# Patient Record
Sex: FEMALE | Race: WHITE | Employment: OTHER | URBAN - METROPOLITAN AREA
[De-identification: names, ages, dates, MRNs, and addresses within clinical notes are randomized per-mention and may not be internally consistent; named-entity substitution may affect disease eponyms.]

---

## 2017-01-17 PROBLEM — I42.9 IDIOPATHIC CARDIOMYOPATHY (HCC): Status: ACTIVE | Noted: 2017-01-17

## 2017-01-17 PROBLEM — I50.20 UNSPECIFIED SYSTOLIC (CONGESTIVE) HEART FAILURE (HCC): Status: ACTIVE | Noted: 2017-01-17

## 2017-02-01 PROBLEM — E78.5 DYSLIPIDEMIA: Chronic | Status: ACTIVE | Noted: 2017-02-01

## 2017-02-01 PROBLEM — I25.119 CORONARY ARTERY DISEASE INVOLVING NATIVE CORONARY ARTERY OF NATIVE HEART WITH ANGINA PECTORIS (HCC): Chronic | Status: ACTIVE | Noted: 2017-02-01

## 2017-02-01 PROBLEM — I34.1 MITRAL VALVE PROLAPSE: Status: ACTIVE | Noted: 2017-02-01

## 2017-02-01 PROBLEM — I51.81 TAKOTSUBO CARDIOMYOPATHY: Chronic | Status: ACTIVE | Noted: 2017-02-01

## 2017-02-01 PROBLEM — I42.9 IDIOPATHIC CARDIOMYOPATHY (HCC): Status: RESOLVED | Noted: 2017-01-17 | Resolved: 2017-02-01

## 2019-04-24 ENCOUNTER — HOSPITAL ENCOUNTER (OUTPATIENT)
Dept: LAB | Age: 84
Discharge: HOME | End: 2019-04-24
Attending: INTERNAL MEDICINE
Payer: MEDICARE

## 2019-04-24 DIAGNOSIS — Z01.810 PREOP CARDIOVASCULAR EXAM: ICD-10-CM

## 2019-04-24 DIAGNOSIS — I49.3 PVC (PREMATURE VENTRICULAR CONTRACTION): ICD-10-CM

## 2019-04-24 DIAGNOSIS — I51.81 TAKOTSUBO CARDIOMYOPATHY: Chronic | ICD-10-CM

## 2019-04-24 DIAGNOSIS — I35.1 NONRHEUMATIC AORTIC VALVE INSUFFICIENCY: ICD-10-CM

## 2019-04-24 DIAGNOSIS — I34.0 NON-RHEUMATIC MITRAL REGURGITATION: ICD-10-CM

## 2019-04-24 LAB
ALBUMIN SERPL-MCNC: 3.7 G/DL (ref 3.2–4.6)
ALBUMIN/GLOB SERPL: 1 {RATIO}
ALP SERPL-CCNC: 78 U/L (ref 50–136)
ALT SERPL-CCNC: 29 U/L (ref 12–65)
ANION GAP SERPL CALC-SCNC: 6 MMOL/L
AST SERPL-CCNC: 28 U/L (ref 15–37)
BASOPHILS # BLD: 0 K/UL (ref 0–0.2)
BASOPHILS NFR BLD: 0 % (ref 0–2)
BILIRUB SERPL-MCNC: 1.5 MG/DL (ref 0.2–1.1)
BUN SERPL-MCNC: 15 MG/DL (ref 8–23)
CALCIUM SERPL-MCNC: 9.8 MG/DL (ref 8.3–10.4)
CHLORIDE SERPL-SCNC: 106 MMOL/L (ref 98–107)
CO2 SERPL-SCNC: 28 MMOL/L (ref 21–32)
CREAT SERPL-MCNC: 0.8 MG/DL (ref 0.6–1)
DIFFERENTIAL METHOD BLD: ABNORMAL
EOSINOPHIL # BLD: 0.1 K/UL (ref 0–0.8)
EOSINOPHIL NFR BLD: 2 % (ref 0.5–7.8)
ERYTHROCYTE [DISTWIDTH] IN BLOOD BY AUTOMATED COUNT: 11.5 % (ref 11.9–14.6)
GLOBULIN SER CALC-MCNC: 3.7 G/DL
GLUCOSE SERPL-MCNC: 93 MG/DL (ref 65–100)
HCT VFR BLD AUTO: 43.3 % (ref 35.8–46.3)
HGB BLD-MCNC: 14.7 G/DL (ref 11.7–15.4)
IMM GRANULOCYTES # BLD AUTO: 0 K/UL (ref 0–0.5)
IMM GRANULOCYTES NFR BLD AUTO: 1 % (ref 0–5)
LYMPHOCYTES # BLD: 1.7 K/UL (ref 0.5–4.6)
LYMPHOCYTES NFR BLD: 28 % (ref 13–44)
MAGNESIUM SERPL-MCNC: 2.1 MG/DL (ref 1.8–2.4)
MCH RBC QN AUTO: 32.9 PG (ref 26.1–32.9)
MCHC RBC AUTO-ENTMCNC: 33.9 G/DL (ref 31.4–35)
MCV RBC AUTO: 96.9 FL (ref 79.6–97.8)
MONOCYTES # BLD: 0.7 K/UL (ref 0.1–1.3)
MONOCYTES NFR BLD: 11 % (ref 4–12)
NEUTS SEG # BLD: 3.5 K/UL (ref 1.7–8.2)
NEUTS SEG NFR BLD: 58 % (ref 43–78)
NRBC # BLD: 0 K/UL (ref 0–0.2)
PLATELET # BLD AUTO: 120 K/UL (ref 150–450)
PMV BLD AUTO: 8.7 FL (ref 9.4–12.3)
POTASSIUM SERPL-SCNC: 4.3 MMOL/L (ref 3.5–5.1)
PROT SERPL-MCNC: 7.4 G/DL (ref 6.3–8.2)
RBC # BLD AUTO: 4.47 M/UL (ref 4.05–5.2)
SODIUM SERPL-SCNC: 140 MMOL/L (ref 136–145)
TSH SERPL DL<=0.005 MIU/L-ACNC: 2.3 UIU/ML (ref 0.36–3.74)
WBC # BLD AUTO: 6 K/UL (ref 4.3–11.1)

## 2019-04-24 PROCEDURE — 36415 COLL VENOUS BLD VENIPUNCTURE: CPT

## 2019-04-24 PROCEDURE — 84443 ASSAY THYROID STIM HORMONE: CPT

## 2019-04-24 PROCEDURE — 83735 ASSAY OF MAGNESIUM: CPT

## 2019-04-24 PROCEDURE — 80053 COMPREHEN METABOLIC PANEL: CPT

## 2019-04-24 PROCEDURE — 85025 COMPLETE CBC W/AUTO DIFF WBC: CPT

## 2019-09-20 PROBLEM — Z01.810 PREOP CARDIOVASCULAR EXAM: Status: RESOLVED | Noted: 2019-04-24 | Resolved: 2019-09-20

## 2019-09-24 ENCOUNTER — HOSPITAL ENCOUNTER (OUTPATIENT)
Dept: LAB | Age: 84
Discharge: HOME | End: 2019-09-24
Attending: INTERNAL MEDICINE
Payer: MEDICARE

## 2019-09-24 ENCOUNTER — HOSPITAL ENCOUNTER (OUTPATIENT)
Dept: GENERAL RADIOLOGY | Age: 84
Discharge: HOME | End: 2019-09-24
Attending: INTERNAL MEDICINE

## 2019-09-24 DIAGNOSIS — I49.3 PVC (PREMATURE VENTRICULAR CONTRACTION): ICD-10-CM

## 2019-09-24 LAB
ALBUMIN SERPL-MCNC: 3.4 G/DL (ref 3.2–4.6)
ALBUMIN/GLOB SERPL: 0.9 {RATIO} (ref 1.2–3.5)
ALP SERPL-CCNC: 69 U/L (ref 50–136)
ALT SERPL-CCNC: 23 U/L (ref 12–65)
AST SERPL-CCNC: 21 U/L (ref 15–37)
BILIRUB DIRECT SERPL-MCNC: 0.2 MG/DL
BILIRUB SERPL-MCNC: 1 MG/DL (ref 0.2–1.1)
GLOBULIN SER CALC-MCNC: 3.9 G/DL (ref 2.3–3.5)
PROT SERPL-MCNC: 7.3 G/DL (ref 6.3–8.2)
TSH SERPL DL<=0.005 MIU/L-ACNC: 2.93 UIU/ML (ref 0.36–3.74)

## 2019-09-24 PROCEDURE — 80076 HEPATIC FUNCTION PANEL: CPT

## 2019-09-24 PROCEDURE — 84443 ASSAY THYROID STIM HORMONE: CPT

## 2019-09-24 PROCEDURE — 36415 COLL VENOUS BLD VENIPUNCTURE: CPT

## 2020-08-11 ENCOUNTER — HOSPITAL ENCOUNTER (OUTPATIENT)
Dept: LAB | Age: 85
Discharge: HOME | End: 2020-08-11
Payer: MEDICARE

## 2020-08-11 DIAGNOSIS — E78.5 DYSLIPIDEMIA: Chronic | ICD-10-CM

## 2020-08-11 LAB
ALBUMIN SERPL-MCNC: 3.5 G/DL (ref 3.2–4.6)
ALBUMIN/GLOB SERPL: 1 {RATIO} (ref 1.2–3.5)
ALP SERPL-CCNC: 69 U/L (ref 50–136)
ALT SERPL-CCNC: 20 U/L (ref 12–65)
AST SERPL-CCNC: 19 U/L (ref 15–37)
BILIRUB DIRECT SERPL-MCNC: 0.2 MG/DL
BILIRUB SERPL-MCNC: 0.8 MG/DL (ref 0.2–1.1)
CHOLEST SERPL-MCNC: 159 MG/DL
GLOBULIN SER CALC-MCNC: 3.5 G/DL (ref 2.3–3.5)
HDLC SERPL-MCNC: 71 MG/DL (ref 40–60)
HDLC SERPL: 2.2 {RATIO}
LDLC SERPL CALC-MCNC: 67.6 MG/DL
LIPID PROFILE,FLP: ABNORMAL
PROT SERPL-MCNC: 7 G/DL (ref 6.3–8.2)
TRIGL SERPL-MCNC: 102 MG/DL (ref 35–150)
VLDLC SERPL CALC-MCNC: 20.4 MG/DL (ref 6–23)

## 2020-08-11 PROCEDURE — 80061 LIPID PANEL: CPT

## 2020-08-11 PROCEDURE — 80076 HEPATIC FUNCTION PANEL: CPT

## 2020-08-11 PROCEDURE — 36415 COLL VENOUS BLD VENIPUNCTURE: CPT

## 2021-04-21 PROBLEM — R07.89 ATYPICAL CHEST PAIN: Status: ACTIVE | Noted: 2021-04-21

## 2021-04-21 PROBLEM — G45.3 AMAUROSIS FUGAX OF LEFT EYE: Status: ACTIVE | Noted: 2021-04-21

## 2021-07-12 ENCOUNTER — HOSPITAL ENCOUNTER (OUTPATIENT)
Dept: LAB | Age: 86
Discharge: HOME | End: 2021-07-12
Payer: MEDICARE

## 2021-07-12 DIAGNOSIS — I48.92 ATRIAL FLUTTER, UNSPECIFIED TYPE (HCC): ICD-10-CM

## 2021-07-12 DIAGNOSIS — E78.5 DYSLIPIDEMIA: ICD-10-CM

## 2021-07-12 LAB
ALBUMIN SERPL-MCNC: 3.7 G/DL (ref 3.2–4.6)
ALBUMIN/GLOB SERPL: 1.2 {RATIO} (ref 1.2–3.5)
ALP SERPL-CCNC: 76 U/L (ref 50–136)
ALT SERPL-CCNC: 26 U/L (ref 12–65)
ANION GAP SERPL CALC-SCNC: 4 MMOL/L (ref 7–16)
AST SERPL-CCNC: 22 U/L (ref 15–37)
BILIRUB SERPL-MCNC: 0.6 MG/DL (ref 0.2–1.1)
BUN SERPL-MCNC: 20 MG/DL (ref 8–23)
CALCIUM SERPL-MCNC: 9.1 MG/DL (ref 8.3–10.4)
CHLORIDE SERPL-SCNC: 107 MMOL/L (ref 98–107)
CHOLEST SERPL-MCNC: 136 MG/DL
CO2 SERPL-SCNC: 27 MMOL/L (ref 21–32)
CREAT SERPL-MCNC: 0.75 MG/DL (ref 0.6–1)
GLOBULIN SER CALC-MCNC: 3.2 G/DL (ref 2.3–3.5)
GLUCOSE SERPL-MCNC: 129 MG/DL (ref 65–100)
HDLC SERPL-MCNC: 60 MG/DL (ref 40–60)
HDLC SERPL: 2.3 {RATIO}
LDLC SERPL CALC-MCNC: 37.2 MG/DL
POTASSIUM SERPL-SCNC: 4 MMOL/L (ref 3.5–5.1)
PROT SERPL-MCNC: 6.9 G/DL (ref 6.3–8.2)
SODIUM SERPL-SCNC: 138 MMOL/L (ref 136–145)
TRIGL SERPL-MCNC: 194 MG/DL (ref 35–150)
VLDLC SERPL CALC-MCNC: 38.8 MG/DL (ref 6–23)

## 2021-07-12 PROCEDURE — 36415 COLL VENOUS BLD VENIPUNCTURE: CPT

## 2021-07-12 PROCEDURE — 80061 LIPID PANEL: CPT

## 2021-07-12 PROCEDURE — 80053 COMPREHEN METABOLIC PANEL: CPT

## 2021-10-18 PROBLEM — R26.89 BALANCE PROBLEMS: Status: ACTIVE | Noted: 2021-10-18

## 2021-10-19 ENCOUNTER — HOSPITAL ENCOUNTER (OUTPATIENT)
Dept: PHYSICAL THERAPY | Age: 86
Discharge: HOME | End: 2021-10-19
Payer: MEDICARE

## 2021-10-19 DIAGNOSIS — R26.89 BALANCE PROBLEMS: ICD-10-CM

## 2021-10-19 PROCEDURE — 97161 PT EVAL LOW COMPLEX 20 MIN: CPT

## 2021-10-19 PROCEDURE — 97110 THERAPEUTIC EXERCISES: CPT

## 2021-10-19 NOTE — PROGRESS NOTES
Alessia Ford  : 1935  Primary: [employer]  Secondary:  [address] at [facility]  [address], [address]  [phone]   [phone]         OUTPATIENT PHYSICAL THERAPY:Daily Note 10/19/2021      TREATMENT:   PT Patient Time In/Time Out  Time In: 1430  Time Out: 1515      Total Time: 45min  Visit Count:  1     ICD-10: Treatment Diagnosis: M62.81, R26.2, R26.89  Medication Last Reviewed: 10/19/21      TREATMENT PLAN  Effective Dates: 10/19/2021 TO 2022 (90 days). Frequency/Duration: 2 times a week for 90 Day(s)         Subjective: See Evaluation Note dated 10/19/2021 for details   Pain:     Objective: See Evaluation Note dated 10/19/2021 for details      Therapeutic Exercise: (20min) Done in order to improve strength, ROM and understanding of current condition.     Date:  10/19 Date:   Date:   Date:     Activity/Exercise Parameters      Education Discussed examination findings, HEP, plan of care      Standing Balance x5min      NuStep x5min                               Manual Therapy: (0min) Done in order to improve joint and soft tissue mobility,reduce muscle guarding, and decrease muscle tone   Date:   Date:   Date:   Date:     Type Parameters      Joint Mobilization       Soft Tissue Mobilization           Modalities: (-) Done in order to reduce swelling and pain    Assessment: See Evaluation Note dated 10/19/2021 for details    Plan: See Evaluation Note dated 10/19/2021 for details    Future Appointments   Date Time Provider Riana Cabrera   2021 11:30 AM ECHO 36 [facility]   2022 11:00 AM Chandni Alvarez MD [facility]       Unbilled Time: 25min eval  Units: 1 eval low/ 1TE      [place], PT, DPT, OCS    Visit Approval Visit # Therapist initials Date A NS / Cx < 24 hr >24 hr Cx Comments    1 WJ 10/19 [x]  [] [] Initial evaluation       [] [] []        [] [] []        [] [] []        [] [] [] [] [] []        [] [] []        [] [] []        [] [] []        [] [] []        [] [] []        [] [] []        [] [] []        [] [] []        [] [] []        [] [] []        [] [] []        [] [] []

## 2021-10-19 NOTE — THERAPY EVALUATION
Alessia Ford  : 1935  Primary: [employer]  Secondary:  [address] at [facility]  [address], [address]  [phone]   [phone]          OUTPATIENT PHYSICAL THERAPY:Initial Assessment 10/19/2021   ICD-10: Treatment Diagnosis: M62.81, R26.2, R26.89  Precautions/Allergies:   Patient has no known allergies. TREATMENT PLAN:  Effective Dates: 10/19/2021 TO 2022 (30 days). Frequency/Duration: 1-4x total MEDICAL/REFERRING DIAGNOSIS:  Balance problems [R26.89]   DATE OF ONSET: 1 year  REFERRING PHYSICIAN: Mary Kay Blake MD MD Orders: Eval and treat  Return MD Appointment:      INITIAL ASSESSMENT:  Ms. Madison Ramires presents to physical therapy with MD diagnosis of a gait and balance problems. Pt demonstrated signs and symptoms consistent with this diagnosis. On objective examination, the patient demonstrated deficits in ROM, strength, joint mobility, soft tissue mobility, functional ability, functional mobility and balance. The patient also had increased pain. The patient is limited in the following activities: walking, standing, transfers, ADLs, functional tasks, bending, lifting. The patient has a good  prognosis for recovery based on the examination findings and may be limited by: financial concerns due to co-pay. Patient requires skilled physical therapy services in order to return to prior level of function. PROBLEM LIST (Impacting functional limitations):  1. Decreased Strength  2. Decreased ADL/Functional Activities  3. Decreased Balance  4. Increased Pain  5. Decreased Knowledge of Precautions  6. Decreased Mor[place] with Home Exercise Program INTERVENTIONS PLANNED: (Treatment may consist of any combination of the following)  1. Balance Exercise  2. Cold  3. Heat  4. Home Exercise Program (HEP)  5. Manual Therapy  6. Neuromuscular Re-education/Strengthening  7. Range of Motion (ROM)  8.  Therapeutic Activites  9. Therapeutic Exercise/Strengthening     GOALS: (Goals have been discussed and agreed upon with patient.)  Discharge Goals: Time Frame: today  1. Pt will be compliant with progressive HEP    OUTCOME MEASURE:   Tool Used: Sosa Balance Scale  Score:  Initial: 39/56 Most Recent: X/56 (Date: -- )   Interpretation of Score: Each section is scored on a 0-4 scale, 0 representing the patients inability to perform the task and 4 representing independence. The scores of each section are added together for a total score of 56. The higher the patients score, the more independent the patient is. Any score below 45 indicates increased risk for falls. MEDICAL NECESSITY:   · Patient is expected to demonstrate progress in strength, range of motion and symptom levels to return to full function. REASON FOR SERVICES/OTHER COMMENTS:  · Patient requires skilled physical therapy in order to return to prior level of function      Rehabilitation Potential For Stated Goals: Good  Regarding Guero Ford's therapy, I certify that the treatment plan above will be carried out by a therapist or under their direction. 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 Pt doesn't feel steady on her feet, has to use a cane when walking outside. Pt states she has a lack of confidence in her balance. Immediately following the stroke, the patient was using a walker, but that didn't last long. Past Medical History/Comorbidities:   Ms. Michael Gannon  has a past medical history of CAD (coronary artery disease), Heart failure ([address] Idiopathic cardiomyopathy ([facility] (1/17/2017), and Other ill-defined conditions(799.89). Ms. Tracey Carter  has a past surgical history that includes hx urological; hx gyn; pr cardiac surg procedure unlist; hx orthopaedic; pr abdomen surgery proc unlisted; and hx heart catheterization (2005). Social History/Living Environment:     Lives alone, stairs in house but does not have to use them  Prior Level of Function/Work/Activity:  Taking care of home, shopping  Personal Factors:          Sex:  female        Age:  82 y.o. Ambulatory/Rehab Services H2 Model Falls Risk Assessment   Risk Factors:       No Risk Factors Identified Ability to Rise from Chair:       (1)  Pushes up, successful in one attempt   Falls Prevention Plan: Mobility Assistance Device (specify):  Cane   Total: (5 or greater = High Risk): 1   ©2010 [facility] [employer] States Patent #[id number]. Federal Law prohibits the replication, distribution or use without written permission from [facility]   Current Medications:       Current Outpatient Medications:     atorvastatin (LIPITOR) 10 mg tablet, Take 1 Tablet by mouth daily. , Disp: 90 Tablet, Rfl: 3    rivaroxaban (Xarelto) 20 mg tab tablet, Take 1 Tablet by mouth daily (with dinner). , Disp: 90 Tablet, Rfl: 3    carvediloL (COREG) 3.125 mg tablet, TAKE 1 TABLET TWICE DAILY WITH MEALS, Disp: 180 Tab, Rfl: 3    ascorbic acid, vitamin C, (VITAMIN C) 500 mg tablet, 500 mg daily. , Disp: , Rfl:     cholecalciferol (VITAMIN D3) (5000 Units/125 mcg) tab tablet, Take 5,000 Units by mouth daily. , Disp: , Rfl:     CENTRUM SILVER Tab, take by mouth daily.  , Disp: , Rfl:     CALTRATE PLUS PO, take by mouth two (2) times a day. , Disp: , Rfl:    Date Last Reviewed:  10/19/21     Number of Personal Factors/Comorbidities that affect the Plan of Care: 1-2: MODERATE COMPLEXITY   EXAMINATION:   *= Painful     WNL= within normal limits     NT= not tested    *= Painful     WNL= within normal limits     NT= not tested    Observation  Gait: walks with cane, some stopping and hesitancy with turning and on initially standing, short steps      Strength (all MMT scores are graded on a scale of 0-5)   Right Left   Hip      Flexion 5 5   Abduction 3+ 3+   Extension 3+ 3+   Glute Max 3+ 3+   Knee     Extension 4 4   Flexion 4 4         Functional Mobility  TUG: 15.5sec  30sec Sit to Stand: 8x (w/ hands)      Balance  Rhomberg:   Eyes open, stable surface: > 100sec   Eyes closed, stable surface: 20sec  Sharpened Rhomberg:   R in front: 3sec   L in front: 5sec  SL balance:   R: 0sec   L: 0sec    Tool Used: TINETTI  Score:  Initial:   Gait: 9/12  Balance: 14/16  TOTAL: 23/28 Most Recent:  Gait: /12  Balance: /16  TOTAL: /28   Interpretation of Score: The maximum score for the gait component is 12 points. The maximum score for the balance component is 16 points. The maximum total score is 28 points. In general, patients who score below 19 are at a high risk for falls. Patients who score in the range of 19-24 indicate that the patient has a risk for falls. Tool Used: Dynamic Gait Index  Score:  Initial: 15/24 Most Recent: X/24 (Date: -- )   Interpretation of Score: Each section is scored on a 0-3 scale, 0 representing the patients inability to perform the task and 3 representing independence. The scores of each section are added together for a total score of 24. Any score below 19 indicates increased risk for falls. Body Structures Involved:  1. Bones  2. Joints  3. Muscles  4. Ligaments Body Functions Affected:  1. Sensory/Pain  2. Neuromusculoskeletal  3. Movement Related Activities and Participation Affected:  1. General Tasks and Demands  2. Mobility  3. Self Care  4. Domestic Life  5. Interpersonal Interactions and Relationships  6.  Community, Social and [place]   Number of elements (examined above) that affect the Plan of Care: 3: MODERATE COMPLEXITY   CLINICAL PRESENTATION:   Presentation: Stable and uncomplicated: LOW COMPLEXITY   CLINICAL DECISION MAKING: Use of outcome tool(s) and clinical judgement create a POC that gives a: Clear prediction of patient's progress: LOW COMPLEXITY     Ivy Saldana PT, DPT, OCS

## 2022-01-27 PROBLEM — R93.1 ABNORMAL NUCLEAR CARDIAC IMAGING TEST: Status: ACTIVE | Noted: 2022-01-27

## 2022-02-04 NOTE — THERAPY DISCHARGE
Alessia CARIAS Vernaconcetta  : 1935  Primary: [employer]  Secondary:  [address]  at [facility]  [address], [facility] [address]  [phone]   [phone]      Angela Valdez has been seen in physical therapy from 10/19 to 10/19 for 1 visits. Treatment has been discontinued at this time due to financial reasons due to high co-pay. The below goals were met prior to discontinuation. Some goals were not met due to unable to return.    Thank you for this referral.         Rondall Romberg PT, DPT, OCS

## 2022-02-07 ENCOUNTER — HOSPITAL ENCOUNTER (OUTPATIENT)
Dept: LAB | Age: 87
Discharge: HOME | End: 2022-02-07
Payer: MEDICARE

## 2022-02-07 DIAGNOSIS — R93.1 ABNORMAL NUCLEAR CARDIAC IMAGING TEST: ICD-10-CM

## 2022-02-07 DIAGNOSIS — I50.22 SYSTOLIC CHF, CHRONIC (HCC): ICD-10-CM

## 2022-02-07 DIAGNOSIS — I25.119 CORONARY ARTERY DISEASE INVOLVING NATIVE CORONARY ARTERY OF NATIVE HEART WITH ANGINA PECTORIS (HCC): Chronic | ICD-10-CM

## 2022-02-07 LAB
ANION GAP SERPL CALC-SCNC: 0 MMOL/L (ref 7–16)
BASOPHILS # BLD: 0 K/UL (ref 0–0.2)
BASOPHILS NFR BLD: 0 % (ref 0–2)
BUN SERPL-MCNC: 18 MG/DL (ref 8–23)
CALCIUM SERPL-MCNC: 9.7 MG/DL (ref 8.3–10.4)
CHLORIDE SERPL-SCNC: 105 MMOL/L (ref 98–107)
CO2 SERPL-SCNC: 31 MMOL/L (ref 21–32)
CREAT SERPL-MCNC: 0.66 MG/DL (ref 0.6–1)
DIFFERENTIAL METHOD BLD: ABNORMAL
EOSINOPHIL # BLD: 0.2 K/UL (ref 0–0.8)
EOSINOPHIL NFR BLD: 3 % (ref 0.5–7.8)
ERYTHROCYTE [DISTWIDTH] IN BLOOD BY AUTOMATED COUNT: 12 % (ref 11.9–14.6)
GLUCOSE SERPL-MCNC: 100 MG/DL (ref 65–100)
HCT VFR BLD AUTO: 39.1 % (ref 35.8–46.3)
HGB BLD-MCNC: 13 G/DL (ref 11.7–15.4)
IMM GRANULOCYTES # BLD AUTO: 0 K/UL (ref 0–0.5)
IMM GRANULOCYTES NFR BLD AUTO: 0 % (ref 0–5)
INR PPP: 1.1
LYMPHOCYTES # BLD: 1.4 K/UL (ref 0.5–4.6)
LYMPHOCYTES NFR BLD: 25 % (ref 13–44)
MCH RBC QN AUTO: 32.5 PG (ref 26.1–32.9)
MCHC RBC AUTO-ENTMCNC: 33.2 G/DL (ref 31.4–35)
MCV RBC AUTO: 97.8 FL (ref 79.6–97.8)
MONOCYTES # BLD: 0.6 K/UL (ref 0.1–1.3)
MONOCYTES NFR BLD: 11 % (ref 4–12)
NEUTS SEG # BLD: 3.5 K/UL (ref 1.7–8.2)
NEUTS SEG NFR BLD: 61 % (ref 43–78)
NRBC # BLD: 0 K/UL (ref 0–0.2)
PLATELET # BLD AUTO: 151 K/UL (ref 150–450)
PMV BLD AUTO: 9 FL (ref 9.4–12.3)
POTASSIUM SERPL-SCNC: 4 MMOL/L (ref 3.5–5.1)
PROTHROMBIN TIME: 14.2 SEC (ref 12.6–14.5)
RBC # BLD AUTO: 4 M/UL (ref 4.05–5.2)
SODIUM SERPL-SCNC: 136 MMOL/L (ref 136–145)
WBC # BLD AUTO: 5.8 K/UL (ref 4.3–11.1)

## 2022-02-07 PROCEDURE — 80048 BASIC METABOLIC PNL TOTAL CA: CPT

## 2022-02-07 PROCEDURE — 85610 PROTHROMBIN TIME: CPT

## 2022-02-07 PROCEDURE — 36415 COLL VENOUS BLD VENIPUNCTURE: CPT

## 2022-02-07 PROCEDURE — 85025 COMPLETE CBC W/AUTO DIFF WBC: CPT

## 2022-02-14 NOTE — PROGRESS NOTES
Patient pre-assessment complete for [facility] scheduled for 2/15/22, arrival time 0730. Patient verified using . Patient instructed to bring all home medications in labeled bottles on the day of procedure. NPO status reinforced. Patient instructed that Xarelto should have been held yesterday, today and tomorrow morning. Instructed they can take all other medications excluding vitamins & supplements. Patient verbalizes understanding of all instructions & denies any questions at this time.

## 2022-02-15 ENCOUNTER — HOSPITAL ENCOUNTER (OUTPATIENT)
Age: 87
Setting detail: OUTPATIENT SURGERY
Discharge: HOME | End: 2022-02-15
Attending: INTERNAL MEDICINE | Admitting: INTERNAL MEDICINE
Payer: MEDICARE

## 2022-02-15 VITALS
WEIGHT: 131 LBS | BODY MASS INDEX: 21.83 KG/M2 | SYSTOLIC BLOOD PRESSURE: 109 MMHG | HEIGHT: 65 IN | HEART RATE: 66 BPM | TEMPERATURE: 98.1 F | RESPIRATION RATE: 16 BRPM | OXYGEN SATURATION: 95 % | DIASTOLIC BLOOD PRESSURE: 64 MMHG

## 2022-02-15 DIAGNOSIS — I25.119 CORONARY ARTERY DISEASE INVOLVING NATIVE CORONARY ARTERY OF NATIVE HEART WITH ANGINA PECTORIS (HCC): Chronic | ICD-10-CM

## 2022-02-15 DIAGNOSIS — R93.1 ABNORMAL NUCLEAR CARDIAC IMAGING TEST: ICD-10-CM

## 2022-02-15 DIAGNOSIS — E78.5 DYSLIPIDEMIA: Chronic | ICD-10-CM

## 2022-02-15 DIAGNOSIS — I34.1 MITRAL VALVE PROLAPSE: ICD-10-CM

## 2022-02-15 LAB
ATRIAL RATE: 62 BPM
CALCULATED P AXIS, ECG09: 75 DEGREES
CALCULATED R AXIS, ECG10: -6 DEGREES
CALCULATED T AXIS, ECG11: 76 DEGREES
DIAGNOSIS, 93000: NORMAL
P-R INTERVAL, ECG05: 202 MS
Q-T INTERVAL, ECG07: 424 MS
QRS DURATION, ECG06: 108 MS
QTC CALCULATION (BEZET), ECG08: 430 MS
VENTRICULAR RATE, ECG03: 62 BPM

## 2022-02-15 PROCEDURE — 74011000250 HC RX REV CODE- 250: Performed by: INTERNAL MEDICINE

## 2022-02-15 PROCEDURE — C1769 GUIDE WIRE: HCPCS | Performed by: INTERNAL MEDICINE

## 2022-02-15 PROCEDURE — C1894 INTRO/SHEATH, NON-LASER: HCPCS | Performed by: INTERNAL MEDICINE

## 2022-02-15 PROCEDURE — C1760 CLOSURE DEV, VASC: HCPCS | Performed by: INTERNAL MEDICINE

## 2022-02-15 PROCEDURE — 74011000258 HC RX REV CODE- 258: Performed by: INTERNAL MEDICINE

## 2022-02-15 PROCEDURE — 77030004558 HC CATH ANGI DX SUPR TORQ CARD -A: Performed by: INTERNAL MEDICINE

## 2022-02-15 PROCEDURE — 93005 ELECTROCARDIOGRAM TRACING: CPT | Performed by: INTERNAL MEDICINE

## 2022-02-15 PROCEDURE — 99153 MOD SED SAME PHYS/QHP EA: CPT | Performed by: INTERNAL MEDICINE

## 2022-02-15 PROCEDURE — 99152 MOD SED SAME PHYS/QHP 5/>YRS: CPT | Performed by: INTERNAL MEDICINE

## 2022-02-15 PROCEDURE — 77030015766: Performed by: INTERNAL MEDICINE

## 2022-02-15 PROCEDURE — 93458 L HRT ARTERY/VENTRICLE ANGIO: CPT | Performed by: INTERNAL MEDICINE

## 2022-02-15 PROCEDURE — 77030016699 HC CATH ANGI DX INFN1 CARD -A: Performed by: INTERNAL MEDICINE

## 2022-02-15 PROCEDURE — 74011000636 HC RX REV CODE- 636: Performed by: INTERNAL MEDICINE

## 2022-02-15 PROCEDURE — 77030029997 HC DEV COM RDL R BND TELE -B: Performed by: INTERNAL MEDICINE

## 2022-02-15 PROCEDURE — 74011250636 HC RX REV CODE- 250/636: Performed by: INTERNAL MEDICINE

## 2022-02-15 RX ORDER — GUAIFENESIN 100 MG/5ML
81-324 LIQUID (ML) ORAL ONCE
Status: DISCONTINUED | OUTPATIENT
Start: 2022-02-15 | End: 2022-02-15 | Stop reason: HOSPADM

## 2022-02-15 RX ORDER — HEPARIN SODIUM 200 [USP'U]/100ML
INJECTION, SOLUTION INTRAVENOUS
Status: COMPLETED | OUTPATIENT
Start: 2022-02-15 | End: 2022-02-15

## 2022-02-15 RX ORDER — SODIUM CHLORIDE 0.9 % (FLUSH) 0.9 %
5-40 SYRINGE (ML) INJECTION EVERY 8 HOURS
Status: DISCONTINUED | OUTPATIENT
Start: 2022-02-15 | End: 2022-02-15 | Stop reason: HOSPADM

## 2022-02-15 RX ORDER — FENTANYL CITRATE 50 UG/ML
INJECTION, SOLUTION INTRAMUSCULAR; INTRAVENOUS AS NEEDED
Status: DISCONTINUED | OUTPATIENT
Start: 2022-02-15 | End: 2022-02-15 | Stop reason: HOSPADM

## 2022-02-15 RX ORDER — MIDAZOLAM HYDROCHLORIDE 1 MG/ML
INJECTION, SOLUTION INTRAMUSCULAR; INTRAVENOUS AS NEEDED
Status: DISCONTINUED | OUTPATIENT
Start: 2022-02-15 | End: 2022-02-15 | Stop reason: HOSPADM

## 2022-02-15 RX ORDER — SODIUM CHLORIDE 9 MG/ML
75 INJECTION, SOLUTION INTRAVENOUS CONTINUOUS
Status: DISCONTINUED | OUTPATIENT
Start: 2022-02-15 | End: 2022-02-15 | Stop reason: HOSPADM

## 2022-02-15 RX ORDER — LIDOCAINE HYDROCHLORIDE 10 MG/ML
INJECTION INFILTRATION; PERINEURAL AS NEEDED
Status: DISCONTINUED | OUTPATIENT
Start: 2022-02-15 | End: 2022-02-15 | Stop reason: HOSPADM

## 2022-02-15 RX ORDER — SODIUM CHLORIDE 0.9 % (FLUSH) 0.9 %
5-40 SYRINGE (ML) INJECTION AS NEEDED
Status: DISCONTINUED | OUTPATIENT
Start: 2022-02-15 | End: 2022-02-15 | Stop reason: HOSPADM

## 2022-02-15 NOTE — DISCHARGE INSTRUCTIONS
HEART CATHETERIZATION/ANGIOGRAPHY DISCHARGE INSTRUCTIONS    1. Check puncture site frequently for swelling or bleeding. If there is any bleeding, lie down and apply pressure over the area with a clean towel or washcloth. Notify your doctor for any redness, swelling, drainage, or oozing from the puncture site. Notify your doctor for any fever or chills. 2. If the extremity becomes cold, numb, or painful call [facility] at [phone].  3. Activity should be limited for the next 48 hours. Climb stairs as little as possible and avoid any stooping, bending, or strenuous activity for 48 hours. No heavy lifting (anything over 10 pounds) for 3 days. 4. You may resume your usual diet. Drink more fluids than usual.  5. Have a responsible person drive you home and stay with you for at least 24 hours after your heart catheterization/angiography. 6. You may remove bandage from your Right wrist in 24 hours. You may shower in 24 hours. No tub baths, hot tubs, or swimming for 1 week. Do not place any lotions, creams, powders, or ointments over puncture site for 1 week. You may place a clean band-aid over the puncture site each day for 5 days. Change daily. I have read the above instructions and have had the opportunity to ask questions.       Patient: ________________________   Date: 2/15/2022    Witness: _______________________   Date: 2/15/2022

## 2022-02-15 NOTE — Clinical Note
No specimen collected. Estimated Blood Loss: <30 mL. no abrasions, no jaundice, no lesions, no pruritis, and no rashes.

## 2022-02-15 NOTE — PROGRESS NOTES
TR band removed from right wrist with no bleeding or swelling noted. 4x4 gauze and tegaderm applied to site.

## 2022-02-15 NOTE — PROGRESS NOTES
TRANSFER - OUT REPORT:    [facility] with Dr. Dannie Morejon  Right radial access  Right femoral access  No intervention    TR band applied to right radial with 12 ml in band  Right femoral closed with 6 Fr Mynx  No bleeding or hematoma, site soft    2 mg versed  50 mcg Fentanyl   1 g ancef    Verbal report given to Brianna Wyman on Meliza Goodwin  being transferred to [facility] for routine progression of care       Report consisted of patients Situation, Background, Assessment and Recommendations(SBAR). Information from the following report(s) Procedure Summary and MAR was reviewed with the receiving nurse. Opportunity for questions and clarification was provided.       Patient transported with:   Registered Nurse

## 2022-02-15 NOTE — PROGRESS NOTES
Assisted to ambulate in hallway with no bleeding or swelling noted from right groin site. No complaints voiced. Written instructions given.

## 2022-02-15 NOTE — PROGRESS NOTES
Pt arrived, ambulated to room with no visible problems, planned [facility] for Dr Liberty Peace. Consent signed, Procedure discussed with pt all questions answered voiced understanding. Medications and history discussed with pt. Pt prepped per ordersThe patient has a fraility score of 5-MILDLY FRAIL, based on ability to complete ADLs without assistance.       Patient took Aspirin 324mg  today at 0600 prior to arrival.

## 2022-02-15 NOTE — PROGRESS NOTES
Report received from Logan. 49. Procedural findings communicated. Intra procedural  medication administration reviewed. Progression of care discussed.      Patient received into [address] [facility] 7 post sheath removal.     Access site without bleeding or swelling yes    Dressing dry and intact yes    Patient instructed to limit movement to right upper extremity    Routine post procedural vital signs and site assessment initiated yes

## 2022-03-18 PROBLEM — I49.3 PVC (PREMATURE VENTRICULAR CONTRACTION): Status: ACTIVE | Noted: 2019-04-24

## 2022-03-18 PROBLEM — R26.89 BALANCE PROBLEMS: Status: ACTIVE | Noted: 2021-10-18

## 2022-03-18 PROBLEM — I51.81 TAKOTSUBO CARDIOMYOPATHY: Status: ACTIVE | Noted: 2017-02-01

## 2022-03-18 PROBLEM — I34.0 NON-RHEUMATIC MITRAL REGURGITATION: Status: ACTIVE | Noted: 2019-04-24

## 2022-03-18 PROBLEM — G45.3 AMAUROSIS FUGAX OF LEFT EYE: Status: ACTIVE | Noted: 2021-04-21

## 2022-03-19 PROBLEM — E78.5 DYSLIPIDEMIA: Status: ACTIVE | Noted: 2017-02-01

## 2022-03-19 PROBLEM — I50.20 UNSPECIFIED SYSTOLIC (CONGESTIVE) HEART FAILURE (HCC): Status: ACTIVE | Noted: 2017-01-17

## 2022-03-19 PROBLEM — R93.1 ABNORMAL NUCLEAR CARDIAC IMAGING TEST: Status: ACTIVE | Noted: 2022-01-27

## 2022-03-19 PROBLEM — I35.1 NONRHEUMATIC AORTIC VALVE INSUFFICIENCY: Status: ACTIVE | Noted: 2019-04-24

## 2022-03-19 PROBLEM — R07.89 ATYPICAL CHEST PAIN: Status: ACTIVE | Noted: 2021-04-21

## 2022-03-19 PROBLEM — I25.119 CORONARY ARTERY DISEASE INVOLVING NATIVE CORONARY ARTERY OF NATIVE HEART WITH ANGINA PECTORIS (HCC): Status: ACTIVE | Noted: 2017-02-01

## 2022-03-20 PROBLEM — I34.1 MITRAL VALVE PROLAPSE: Status: ACTIVE | Noted: 2017-02-01

## 2022-05-11 DIAGNOSIS — E78.49 OTHER HYPERLIPIDEMIA: Primary | ICD-10-CM

## 2022-05-13 DIAGNOSIS — E78.5 DYSLIPIDEMIA: ICD-10-CM

## 2022-05-13 DIAGNOSIS — I51.81 TAKOTSUBO CARDIOMYOPATHY: Primary | ICD-10-CM

## 2022-06-02 RX ORDER — RIVAROXABAN 20 MG/1
TABLET, FILM COATED ORAL
Qty: 90 TABLET | Refills: 3 | Status: SHIPPED | OUTPATIENT
Start: 2022-06-02

## 2022-10-20 ENCOUNTER — TELEPHONE (OUTPATIENT)
Dept: CARDIOLOGY CLINIC | Age: 87
End: 2022-10-20

## 2022-10-20 NOTE — TELEPHONE ENCOUNTER
Thinks she missed her appointment with DR Tierney Freitas ,She just saw PCP Krzysztof Chamorro and she did labwork and EKG and wants to know can we see those results or does she need to make appt here.  Please call

## 2022-11-14 NOTE — TELEPHONE ENCOUNTER
Told pt that we can call in a few Michail Dessert to her local pharmacy until her mail order arrives. Pt asked to call in a Rx to [facility]

## 2022-11-14 NOTE — TELEPHONE ENCOUNTER
This patient is waiting on her xarelto to come in from her mail order pharmacy. Can she leave it off couple of days.

## 2023-01-02 ASSESSMENT — ENCOUNTER SYMPTOMS
ABDOMINAL PAIN: 0
ABDOMINAL DISTENTION: 0
COUGH: 0
SHORTNESS OF BREATH: 0
PHOTOPHOBIA: 0
CONSTIPATION: 0
DIARRHEA: 0
SORE THROAT: 0

## 2023-01-02 NOTE — PROGRESS NOTES
[facility] [[email or web address]] CARDIOLOGY  [address] [place] [address], [address] [facility], [address] [place] [address]  PHONE: [phone]        NAME:  Suzette Roberts  : 1935  MRN: [id number]     PCP:  Savannah Tran MD      SUBJECTIVE:   Suzette Roberts is a 82 y.o. female seen for a follow up visit regarding the following:     Chief Complaint   Patient presents with    Coronary Artery Disease    Hyperlipidemia       HPI:     Doing well since last visit without interval angina, CHF, palpitations, edema, presyncope or syncope  ( has had Takotsubo CM at least twice in the past, with LHC 6 years ago showing minimal coronary irregularities at most ). Vitals controlled and tolerating meds well. She reports increasing issues with balance and gait stability, but denies any interval falls or injury. She is using her cane religiously  and staying well-hydrated without any postural symptoms, but states that her legs are getting weaker and she feels like her fall risk is increasing. She has no history of palpitations prior to or since her admission to [facility] and no prior history of atrial fibrillation but subsequent Holter monitor demonstrated burst of atrial tachycardia and probable burst of atrial flutter lasting up to and greater than 1 hour at a time. She has an elevated [id number] score and has been tolerating Xarelto very well with no interval bleeding or medication intolerance whatsoever. She had a Takotsubo CM with acute CHF in  after the death of a loved one, and another episode acutely a few years ago, similar situation. LHC both times showed minimal non-obstructive CAD and EF recovered nicely to 50% with very mild anteroapical  HK. She has mild mitral valve prolapse and dyslipidemia as well. Repeat echo recently showed ejection fraction 45 to 50% and we proceeded with a Lexiscan nuclear stress test which unfortunately showed mid to distal anteroseptal and apical ischemia.  She noted recurrent exertional dyspnea and fatigue but no eliezer angina:      Heart cath 2/15/2022:    Very mild nonobstructive coronary disease-false positive nuclear stress test    Low normal ejection fraction    Search for alternative etiology for the patient's recurrent chest pain      Doing well since last visit without interval angina, CHF, palpitations, edema, presyncope or syncope. Vitals controlled and tolerating meds well. Staying active without any significant limitations. Creatinine clearance 43, decreasing Xarelto dose to 15 mg nightly with food. Past Medical History, Past Surgical History, Family history, Social History, and Medications were all reviewed with the patient today and updated as necessary. Current Outpatient Medications   Medication Sig Dispense Refill    rivaroxaban (XARELTO) 20 MG TABS tablet Take 1 tablet by mouth once for 1 dose 10 tablet 0    Calcium Carbonate-Vit D-Min (CALTRATE PLUS PO) Take by mouth      ascorbic acid (VITAMIN C) 500 MG tablet 500 mg daily      atorvastatin (LIPITOR) 10 MG tablet Take 10 mg by mouth daily      carvedilol (COREG) 3.125 MG tablet TAKE 1 TABLET TWICE DAILY WITH MEALS      vitamin D3 (CHOLECALCIFEROL) 125 MCG (5000 UT) TABS tablet Take 5,000 Units by mouth daily       No current facility-administered medications for this visit.             Allergies   Allergen Reactions    Nitrofurantoin Rash     Other reaction(s): Rash-Allergy       Patient Active Problem List    Diagnosis Date Noted    Abnormal nuclear cardiac imaging test 01/27/2022     Overview Note:     Added automatically from request for surgery [id number]        Balance problems 10/18/2021    Amaurosis fugax of left eye 04/21/2021    Atypical chest pain 04/21/2021    Non-rheumatic mitral regurgitation 04/24/2019    PVC (premature ventricular contraction) 04/24/2019    Nonrheumatic aortic valve insufficiency 04/24/2019    Takotsubo cardiomyopathy 02/01/2017    Coronary artery disease involving native coronary artery of native heart with angina pectoris ([facility] 02/01/2017    Dyslipidemia 02/01/2017    Mitral valve prolapse 02/01/2017    Unspecified systolic (congestive) heart failure ([facility] 01/17/2017    Partial [facility] obstruction ([facility] 03/27/2013        Past Surgical History:   Procedure Laterality Date    CARDIAC CATHETERIZATION  2005    GYN      ORTHOPEDIC SURGERY      left hip    MA UNLISTED PROCEDURE ABDOMEN PERITONEUM & OMENTUM      sbo/ hernia    MA UNLISTED PROCEDURE CARDIAC SURGERY      cath    UROLOGICAL SURGERY      bladder with mesh and revisions       Family History   Problem Relation Age of Onset    Hypertension Other     Breast Cancer Mother 80        Social History     Tobacco Use    Smoking status: Never    Smokeless tobacco: Never   Substance Use Topics    Alcohol use: No       ROS:    Review of Systems   Constitutional:  Negative for appetite change, chills, diaphoresis and fatigue. HENT:  Negative for congestion, mouth sores, nosebleeds, sore throat and tinnitus. Eyes:  Negative for photophobia and visual disturbance. Respiratory:  Negative for cough and shortness of breath. Cardiovascular:  Negative for chest pain, palpitations and leg swelling. Gastrointestinal:  Negative for abdominal distention, abdominal pain, constipation and diarrhea. Endocrine: Negative for cold intolerance, heat intolerance, polydipsia and polyuria. Genitourinary:  Negative for dysuria and hematuria. Musculoskeletal:  Negative for arthralgias, joint swelling and myalgias. Skin:  Negative for rash. Allergic/Immunologic: Negative for environmental allergies and food allergies. Neurological:  Negative for dizziness, seizures, syncope and light-headedness. Hematological:  Negative for adenopathy. Does not bruise/bleed easily. Psychiatric/Behavioral:  Negative for agitation, behavioral problems, dysphoric mood and hallucinations. The patient is not nervous/anxious.        PHYSICAL EXAM:     Vitals:    01/03/23 1436   BP: 130/70   Pulse: 65   Weight: 130 lb 9 oz (59.2 kg)   Height: 5' 5\" (1.651 m)      Wt Readings from Last 3 Encounters:   01/03/23 130 lb 9 oz (59.2 kg)   03/02/22 133 lb (60.3 kg)   01/25/22 131 lb 6.4 oz (59.6 kg)      BP Readings from Last 3 Encounters:   01/03/23 130/70   03/02/22 110/60   01/25/22 130/78        Physical Exam  Constitutional:       Appearance: Normal appearance. She is normal weight. HENT:      Head: Normocephalic and atraumatic. Nose: Nose normal.      Mouth/Throat:      Mouth: Mucous membranes are moist.      Pharynx: Oropharynx is clear. Eyes:      Extraocular Movements: Extraocular movements intact. Pupils: Pupils are equal, round, and reactive to light. Neck:      Vascular: No carotid bruit or JVD. Cardiovascular:      Rate and Rhythm: Normal rate and regular rhythm. Heart sounds: No murmur heard. No friction rub. No gallop. Pulmonary:      Effort: Pulmonary effort is normal.      Breath sounds: Normal breath sounds. No wheezing or rhonchi. Abdominal:      General: Abdomen is flat. Bowel sounds are normal. There is no distension. Palpations: Abdomen is soft. Tenderness: There is no abdominal tenderness. Musculoskeletal:         General: No swelling. Normal range of motion. Cervical back: Normal range of motion and neck supple. No tenderness. Skin:     General: Skin is warm and dry. Neurological:      General: No focal deficit present. Mental Status: She is alert and oriented to person, place, and time. Mental status is at baseline. Psychiatric:         Mood and Affect: Mood normal.         Behavior: Behavior normal.        Medical problems and test results were reviewed with the patient today.        Lab Results   Component Value Date    CHOL 136 07/12/2021    CHOL 159 08/11/2020     Lab Results   Component Value Date    TRIG 194 (H) 07/12/2021    TRIG 102 08/11/2020     Lab Results   Component Value Date    HDL 60 07/12/2021    HDL 71 (H) 08/11/2020     Lab Results   Component Value Date    LDLCALC 37.2 07/12/2021    LDLCALC 67.6 08/11/2020     Lab Results   Component Value Date    LABVLDL 38.8 (H) 07/12/2021    LABVLDL 20.4 08/11/2020     Lab Results   Component Value Date    CHOLHDLRATIO 2.3 07/12/2021    CHOLHDLRATIO 2.2 08/11/2020        Lab Results   Component Value Date/Time     02/07/2022 01:14 PM    K 4.0 02/07/2022 01:14 PM     02/07/2022 01:14 PM    CO2 31 02/07/2022 01:14 PM    BUN 18 02/07/2022 01:14 PM    CREATININE 0.66 02/07/2022 01:14 PM    GLUCOSE 100 02/07/2022 01:14 PM    CALCIUM 9.7 02/07/2022 01:14 PM   Creatinine clearance calculated at 43 based on most recent creatinine in care everywhere    No results for input(s): WBC, HGB, HCT, MCV, PLT in the last 720 hours. No results found for: LABA1C  No results found for: EAG     No results found for: BNP     Lab Results   Component Value Date    TSH 2.930 09/24/2019        Results for orders placed or performed in visit on 01/03/23   EKG 12 lead    Impression    Sinus  Rhythm 65 bpm  Incomplete right bundle branch block  Nonspecific ST-T wave changes  Borderline first-degree AV block  Borderline biatrial enlargement            ASSESSMENT and PLAN     Diagnoses and all orders for this visit:      Takotsubo cardiomyopathy- resolved, asymptomatic, continue meds- echo at [facility] recently showed normal left ventricular systolic function. Coronary artery disease involving native coronary artery of native heart with dyspnea on exertion and abnormal nuclear perfusion anteroapically. False positive nuclear stress test as noted above. Minimal coronary irregularities and normal left ventricular  function. Search for alternative etiology for patient's chest pain if it recurs. Mitral valve prolapse- benign exam, systolic bowing of MV anterior leaflet without prolapse now, moderate mild MR by echo last month.  Recheck in 6 months. Aortic regurgitation- moderate at most, asymptomatic, continue surveillance. Dyslipidemia-continue statin with outpatient PCP surveillance      Fatigue/decreased stamina- labs OK, TSH OK. AST and ALT OK. See below. PVC's- frequent,  ~24K IN 24 HOURS, (23% DAILY BEATS) -resolved with amiodarone therapy in the past.  Currently only on beta-blocker therapy with minimal interval palpitations and normal sinus rhythm today. Continue current therapy      Amaurosis Fugax of the left eye-? Etiology-negative noninvasive imaging at [facility] as noted above. Atrial flutter noted on telemetry. Elevated [id number] score. Continue Xarelto but decrease to 15 mg with food given creatinine clearance of 43. Call immediately with any bleeding issues. Atrial flutter/atrial tachycardia-with Amaurosis of the left eye. Clinically asymptomatic throughout the monitoring period with no palpitations or tachycardia despite the presence of obvious atrial flutter/fibrillation. Anticoagulate as noted above. Reassess clinically, call with any acute bleeding issues promptly. Balance issues/weakness- using cane without any interval falls but feels that her balance is getting worse, increased fall risk despite using a cane. Ask for Home PT to assess and treat as tolerated/needed to increase balance/gait stability. Return in about 6 months (around 7/3/2023).          Vasquez Oliva MD  1/3/2023  3:07 PM

## 2023-01-03 ENCOUNTER — OFFICE VISIT (OUTPATIENT)
Dept: CARDIOLOGY CLINIC | Age: 88
End: 2023-01-03
Payer: MEDICARE

## 2023-01-03 VITALS
BODY MASS INDEX: 21.75 KG/M2 | HEART RATE: 65 BPM | WEIGHT: 130.56 LBS | HEIGHT: 65 IN | DIASTOLIC BLOOD PRESSURE: 70 MMHG | SYSTOLIC BLOOD PRESSURE: 130 MMHG

## 2023-01-03 DIAGNOSIS — I49.3 PVC (PREMATURE VENTRICULAR CONTRACTION): ICD-10-CM

## 2023-01-03 DIAGNOSIS — I51.81 TAKOTSUBO CARDIOMYOPATHY: ICD-10-CM

## 2023-01-03 DIAGNOSIS — I34.0 NON-RHEUMATIC MITRAL REGURGITATION: ICD-10-CM

## 2023-01-03 DIAGNOSIS — I25.119 CORONARY ARTERY DISEASE INVOLVING NATIVE CORONARY ARTERY OF NATIVE HEART WITH ANGINA PECTORIS (HCC): ICD-10-CM

## 2023-01-03 DIAGNOSIS — I35.1 NONRHEUMATIC AORTIC VALVE INSUFFICIENCY: ICD-10-CM

## 2023-01-03 DIAGNOSIS — R07.89 ATYPICAL CHEST PAIN: ICD-10-CM

## 2023-01-03 DIAGNOSIS — I34.1 MITRAL VALVE PROLAPSE: Primary | ICD-10-CM

## 2023-01-03 PROCEDURE — 93000 ELECTROCARDIOGRAM COMPLETE: CPT | Performed by: INTERNAL MEDICINE

## 2023-01-03 PROCEDURE — 99214 OFFICE O/P EST MOD 30 MIN: CPT | Performed by: INTERNAL MEDICINE

## 2023-01-03 PROCEDURE — 1123F ACP DISCUSS/DSCN MKR DOCD: CPT | Performed by: INTERNAL MEDICINE

## 2023-03-23 ENCOUNTER — TELEPHONE (OUTPATIENT)
Dept: CARDIOLOGY CLINIC | Age: 88
End: 2023-03-23

## 2023-03-23 NOTE — TELEPHONE ENCOUNTER
Pt called with questions regarding xarleto 15MG RX.  Pharmacy suggests taking med at night despite instructions

## 2023-03-23 NOTE — TELEPHONE ENCOUNTER
It does not matter when she takes it as long she takes it with a meal.  Around dinner or around breakfast is fine with me.   Either is fine as long as she does at around the same time every day surrounding a meal

## 2023-04-03 RX ORDER — ATORVASTATIN CALCIUM 10 MG/1
TABLET, FILM COATED ORAL
Qty: 90 TABLET | Refills: 3 | Status: SHIPPED | OUTPATIENT
Start: 2023-04-03

## 2023-04-03 RX ORDER — CARVEDILOL 3.12 MG/1
TABLET ORAL
Qty: 180 TABLET | Refills: 3 | Status: SHIPPED | OUTPATIENT
Start: 2023-04-03

## 2023-04-03 NOTE — TELEPHONE ENCOUNTER
Requested Prescriptions     Pending Prescriptions Disp Refills    carvedilol (COREG) 3.125 MG tablet [Pharmacy Med Name: CARVEDILOL 3.125 MG Tablet] 180 tablet      Sig: TAKE 1 TABLET TWICE DAILY WITH MEALS    atorvastatin (LIPITOR) 10 MG tablet [Pharmacy Med Name: ATORVASTATIN CALCIUM 10 MG Tablet] 90 tablet      Sig: TAKE 1 TABLET EVERY DAY

## 2023-07-09 ASSESSMENT — ENCOUNTER SYMPTOMS
DIARRHEA: 0
PHOTOPHOBIA: 0
ABDOMINAL DISTENTION: 0
SHORTNESS OF BREATH: 0
COUGH: 0
ABDOMINAL PAIN: 0
CONSTIPATION: 0
SORE THROAT: 0

## 2023-07-10 NOTE — PROGRESS NOTES
[facility] CARDIOLOGY  [address] [facility], [facility], [address]  PHONE: [phone]        NAME:  Galina Nobles  : 1935  MRN: [id number]     PCP:  Jostin Holm MD      SUBJECTIVE:   Galina Nobles is a 82 y.o. female seen for a follow up visit regarding the following:     Chief Complaint   Patient presents with    6 Month Follow-Up    Results    Coronary Artery Disease       HPI:    Doing well since last visit without interval angina, CHF, palpitations, edema, presyncope or syncope (has had Takotsubo CM at least twice in the past, with LHC 6 years ago showing minimal coronary irregularities at most). Vitals controlled and tolerating meds well. She reports increasing issues with balance and gait stability, but denies any interval falls or injury. She is using her walker see religiously and staying well-hydrated without any postural symptoms recently. She has no history of palpitations prior to or since her admission to [facility] and no prior history of atrial fibrillation but subsequent Holter monitor demonstrated burst of atrial tachycardia and probable burst of atrial flutter lasting up to and greater than 1 hour at a time. She has an elevated [id number] score and has been tolerating Xarelto very well with no interval bleeding or medication intolerance whatsoever. She had a Takotsubo CM with acute CHF in  after the death of a loved one, and another episode acutely a few years ago, similar situation. LHC both times showed minimal non-obstructive CAD and EF recovered nicely to 45-50% with very mild anteroapical  HK. She has mild mitral valve prolapse and dyslipidemia as well. Repeat echo a year and a half ago recently showed ejection fraction 45 to 50% and we proceeded with a Lexiscan nuclear stress test which unfortunately showed mid to distal anteroseptal and apical ischemia.  She noted recurrent exertional dyspnea and fatigue but

## 2023-07-11 ENCOUNTER — OFFICE VISIT (OUTPATIENT)
Age: 88
End: 2023-07-11
Payer: MEDICARE

## 2023-07-11 VITALS
HEART RATE: 60 BPM | DIASTOLIC BLOOD PRESSURE: 64 MMHG | WEIGHT: 122 LBS | HEIGHT: 65 IN | BODY MASS INDEX: 20.33 KG/M2 | SYSTOLIC BLOOD PRESSURE: 100 MMHG

## 2023-07-11 DIAGNOSIS — E78.5 DYSLIPIDEMIA: ICD-10-CM

## 2023-07-11 DIAGNOSIS — I35.1 NONRHEUMATIC AORTIC VALVE INSUFFICIENCY: ICD-10-CM

## 2023-07-11 DIAGNOSIS — I49.3 PVC (PREMATURE VENTRICULAR CONTRACTION): Primary | ICD-10-CM

## 2023-07-11 DIAGNOSIS — I51.81 TAKOTSUBO CARDIOMYOPATHY: ICD-10-CM

## 2023-07-11 DIAGNOSIS — I34.0 NON-RHEUMATIC MITRAL REGURGITATION: ICD-10-CM

## 2023-07-11 DIAGNOSIS — I25.119 CORONARY ARTERY DISEASE INVOLVING NATIVE CORONARY ARTERY OF NATIVE HEART WITH ANGINA PECTORIS (HCC): ICD-10-CM

## 2023-07-11 PROCEDURE — G8427 DOCREV CUR MEDS BY ELIG CLIN: HCPCS | Performed by: INTERNAL MEDICINE

## 2023-07-11 PROCEDURE — G8400 PT W/DXA NO RESULTS DOC: HCPCS | Performed by: INTERNAL MEDICINE

## 2023-07-11 PROCEDURE — 1123F ACP DISCUSS/DSCN MKR DOCD: CPT | Performed by: INTERNAL MEDICINE

## 2023-07-11 PROCEDURE — G8420 CALC BMI NORM PARAMETERS: HCPCS | Performed by: INTERNAL MEDICINE

## 2023-07-11 PROCEDURE — 99214 OFFICE O/P EST MOD 30 MIN: CPT | Performed by: INTERNAL MEDICINE

## 2023-07-11 PROCEDURE — 1090F PRES/ABSN URINE INCON ASSESS: CPT | Performed by: INTERNAL MEDICINE

## 2023-07-11 PROCEDURE — 1036F TOBACCO NON-USER: CPT | Performed by: INTERNAL MEDICINE

## 2023-07-11 PROCEDURE — 93000 ELECTROCARDIOGRAM COMPLETE: CPT | Performed by: INTERNAL MEDICINE

## 2023-09-18 ENCOUNTER — TELEPHONE (OUTPATIENT)
Age: 88
End: 2023-09-18

## 2023-09-18 ENCOUNTER — CLINICAL DOCUMENTATION (OUTPATIENT)
Dept: CARDIOLOGY CLINIC | Age: 88
End: 2023-09-18
Payer: MEDICARE

## 2023-09-18 DIAGNOSIS — I48.0 PAROXYSMAL ATRIAL FIBRILLATION (HCC): Primary | ICD-10-CM

## 2023-09-18 DIAGNOSIS — I34.0 NON-RHEUMATIC MITRAL REGURGITATION: ICD-10-CM

## 2023-09-18 PROCEDURE — 99442 PR PHYS/QHP TELEPHONE EVALUATION 11-20 MIN: CPT | Performed by: INTERNAL MEDICINE

## 2023-09-18 NOTE — TELEPHONE ENCOUNTER
The correct dosage for Xarelto is 15 mg once daily with a meal.  Not 10 mg. If her systolic is greater than 913 and her heart rate is greater than 70, increase carvedilol to 6.25 mg twice daily but stay hydrated. This may help prevent the atrial fibrillation as well.   If intolerant to the 6.25 mg twice daily dose, okay to decrease back down to 3.125 mg twice daily

## 2023-09-18 NOTE — PROGRESS NOTES
Patient initiated outside phone call received. Time spent reviewing chart, old records, formulating a plan, and responding is greater than 11 minutes. The patient's most recent encounter has been reviewed. Medications and appropriate lab work have been reviewed. Appropriate imaging studies were reviewed. Formulated plan and response: The correct dosage for Xarelto is 15 mg once daily with a meal.  Not 10 mg. If her systolic is greater than 590 and her heart rate is greater than 70, increase carvedilol to 6.25 mg twice daily but stay hydrated. This may help prevent the atrial fibrillation as well. If intolerant to the 6.25 mg twice daily dose, okay to decrease back down to 3.125 mg twice daily      TERI Fitzgerald MD

## 2023-09-18 NOTE — TELEPHONE ENCOUNTER
Patient called stating she has the following issues :    ER visit this past weekend with Af[facility]  No longer in Afib  Patient ask are there any concerns or considerations that need to be addressed after the ER visit  Dosage clarifications for Xarelto, 10 mg vs 15 mg    Please call and advise.

## 2023-09-25 ENCOUNTER — TELEPHONE (OUTPATIENT)
Age: 88
End: 2023-09-25

## 2023-09-25 NOTE — TELEPHONE ENCOUNTER
Patient said she is having problems with feet swelling and this has been going on for about 2 weeks. She said this is a new problem she has never had before.

## 2023-09-26 NOTE — TELEPHONE ENCOUNTER
Pt states she has had increased swelling in her feet up to ankles x 4 weeks. Can still wear shoes. Goes all the way down at night some nights. Denies any increased SOB. States she does drink some water through the day. Does not check VS at home. Triage encouraged pt to elevate feet above bladder whenever sitting, follow low sodium diet, hydrate with water up to 6-8, 8oz glasses/day, wear compression socks, and call our office back with an update in a few days. Pt verbalizes understanding to all and agrees to plan.

## 2023-12-11 ENCOUNTER — TELEPHONE (OUTPATIENT)
Age: 88
End: 2023-12-11

## 2023-12-11 NOTE — TELEPHONE ENCOUNTER
Pt c/o edema in bilateral lower extremities x 2-3 weeks; does not go completely down at night. Denies increased SOB and orthopnea. This is new. PCP told her edema was from varicose veins pt was advised to wear compression socks. Edema has worsened despite this. Follows a low sodium diet. Weight is up around 5lbs. Triage advised pt to inform PCP compression socks have not helped swelling and ask for recommendations; encouraged pt call our office back if PCP feels pt's edema needs evaluation from cardiology. F/u with Dr. Claudia Delgadillo is 1/15/24.

## 2023-12-11 NOTE — TELEPHONE ENCOUNTER
Pt having swelling in both legs,  for several weeks and now going to both knees. When bending legs feels like they are going to pop. Went to family dr who said it was probably be varicose veins and to wear stockings which is not helping and does not think it is from varicose veins. No other symptoms.

## 2023-12-19 DIAGNOSIS — I51.81 TAKOTSUBO CARDIOMYOPATHY: ICD-10-CM

## 2023-12-19 DIAGNOSIS — I49.3 PVC (PREMATURE VENTRICULAR CONTRACTION): ICD-10-CM

## 2023-12-19 DIAGNOSIS — I34.1 MITRAL VALVE PROLAPSE: ICD-10-CM

## 2023-12-19 LAB
ANION GAP SERPL CALC-SCNC: 6 MMOL/L (ref 2–11)
BUN SERPL-MCNC: 17 MG/DL (ref 8–23)
CALCIUM SERPL-MCNC: 9 MG/DL (ref 8.3–10.4)
CHLORIDE SERPL-SCNC: 106 MMOL/L (ref 103–113)
CO2 SERPL-SCNC: 27 MMOL/L (ref 21–32)
CREAT SERPL-MCNC: 0.9 MG/DL (ref 0.6–1)
ERYTHROCYTE [DISTWIDTH] IN BLOOD BY AUTOMATED COUNT: 13.2 % (ref 11.9–14.6)
GLUCOSE SERPL-MCNC: 99 MG/DL (ref 65–100)
HCT VFR BLD AUTO: 41.2 % (ref 35.8–46.3)
HGB BLD-MCNC: 13 G/DL (ref 11.7–15.4)
MAGNESIUM SERPL-MCNC: 2.1 MG/DL (ref 1.8–2.4)
MCH RBC QN AUTO: 31.6 PG (ref 26.1–32.9)
MCHC RBC AUTO-ENTMCNC: 31.6 G/DL (ref 31.4–35)
MCV RBC AUTO: 100.2 FL (ref 82–102)
NRBC # BLD: 0 K/UL (ref 0–0.2)
PLATELET # BLD AUTO: 128 K/UL (ref 150–450)
PMV BLD AUTO: 9.6 FL (ref 9.4–12.3)
POTASSIUM SERPL-SCNC: 4 MMOL/L (ref 3.5–5.1)
RBC # BLD AUTO: 4.11 M/UL (ref 4.05–5.2)
SODIUM SERPL-SCNC: 139 MMOL/L (ref 136–146)
WBC # BLD AUTO: 5.2 K/UL (ref 4.3–11.1)

## 2023-12-26 DIAGNOSIS — I49.3 PVC (PREMATURE VENTRICULAR CONTRACTION): ICD-10-CM

## 2023-12-26 DIAGNOSIS — I34.0 NON-RHEUMATIC MITRAL REGURGITATION: ICD-10-CM

## 2023-12-26 DIAGNOSIS — I35.1 NONRHEUMATIC AORTIC VALVE INSUFFICIENCY: ICD-10-CM

## 2023-12-26 DIAGNOSIS — I51.81 TAKOTSUBO CARDIOMYOPATHY: ICD-10-CM

## 2023-12-26 DIAGNOSIS — I34.1 MITRAL VALVE PROLAPSE: ICD-10-CM

## 2023-12-26 LAB
ANION GAP SERPL CALC-SCNC: 7 MMOL/L (ref 2–11)
BUN SERPL-MCNC: 22 MG/DL (ref 8–23)
CALCIUM SERPL-MCNC: 9.6 MG/DL (ref 8.3–10.4)
CHLORIDE SERPL-SCNC: 102 MMOL/L (ref 103–113)
CO2 SERPL-SCNC: 30 MMOL/L (ref 21–32)
CREAT SERPL-MCNC: 1 MG/DL (ref 0.6–1)
GLUCOSE SERPL-MCNC: 134 MG/DL (ref 65–100)
MAGNESIUM SERPL-MCNC: 2.3 MG/DL (ref 1.8–2.4)
POTASSIUM SERPL-SCNC: 3.9 MMOL/L (ref 3.5–5.1)
SODIUM SERPL-SCNC: 139 MMOL/L (ref 136–146)

## 2024-01-22 NOTE — PROGRESS NOTES
[facility] CARDIOLOGY  [address] [place] [address], [address]  [place] [address]  PHONE: [phone]        NAME:  Makenna Salians  : 1935  MRN: [id number]     PCP:  Laisha Pirest MD      SUBJECTIVE:   Makenna Salinas is a 88 y.o. female seen for a follow up visit regarding the following:     Chief Complaint   Patient presents with    Cardiac Valve Problem    Results     echo       HPI:    She presents with a 10 to 14 pound weight gain, worsening exertional dyspnea, elevated JVD and severe lower extremity edema despite 20 mg of Lasix over the past several days.  She denies any significant dietary changes.  She has had Takotsubo CM at least twice in the past, with LHC 6 years ago showing minimal coronary irregularities at most.   Vitals otherwise controlled and tolerating meds well.  She reports increasing issues with balance and gait stability, but denies any interval falls or injury.  She is using her walker see religiously and staying well-hydrated without any postural symptoms recently.       She has no history of palpitations prior to or since her admission to [facility] and no prior history of atrial fibrillation but subsequent Holter monitor demonstrated burst of atrial tachycardia and probable burst of atrial flutter lasting up to and greater than 1 hour at a time.  She has an elevated [id number]-SWIm score and has been tolerating Xarelto very well with no interval bleeding or medication intolerance whatsoever.      She had a Takotsubo CM with acute CHF in  after the death of a loved one, and another episode acutely a few years ago, similar situation.  LHC both times showed minimal non-obstructive CAD and EF recovered nicely to 45-50% with very mild anteroapical  HK.  She has mild mitral valve prolapse and dyslipidemia as well.        Repeat echo a year and a half ago recently showed ejection fraction 45 to 50% and we proceeded with a Lexiscan nuclear stress test which

## 2024-01-26 ENCOUNTER — OFFICE VISIT (OUTPATIENT)
Age: 89
End: 2024-01-26

## 2024-01-26 VITALS
HEIGHT: 65 IN | DIASTOLIC BLOOD PRESSURE: 80 MMHG | WEIGHT: 124.9 LBS | HEART RATE: 80 BPM | BODY MASS INDEX: 20.81 KG/M2 | SYSTOLIC BLOOD PRESSURE: 110 MMHG

## 2024-01-26 DIAGNOSIS — I35.1 NONRHEUMATIC AORTIC VALVE INSUFFICIENCY: ICD-10-CM

## 2024-01-26 DIAGNOSIS — I51.81 TAKOTSUBO CARDIOMYOPATHY: ICD-10-CM

## 2024-01-26 DIAGNOSIS — I34.0 NON-RHEUMATIC MITRAL REGURGITATION: ICD-10-CM

## 2024-01-26 DIAGNOSIS — I34.1 MITRAL VALVE PROLAPSE: ICD-10-CM

## 2024-01-26 DIAGNOSIS — I49.3 PVC (PREMATURE VENTRICULAR CONTRACTION): ICD-10-CM

## 2024-01-26 DIAGNOSIS — I51.81 TAKOTSUBO CARDIOMYOPATHY: Primary | ICD-10-CM

## 2024-01-26 DIAGNOSIS — I25.119 CORONARY ARTERY DISEASE INVOLVING NATIVE CORONARY ARTERY OF NATIVE HEART WITH ANGINA PECTORIS (HCC): ICD-10-CM

## 2024-01-26 LAB
ANION GAP SERPL CALC-SCNC: 3 MMOL/L (ref 2–11)
BUN SERPL-MCNC: 20 MG/DL (ref 8–23)
CALCIUM SERPL-MCNC: 9.7 MG/DL (ref 8.3–10.4)
CHLORIDE SERPL-SCNC: 103 MMOL/L (ref 103–113)
CO2 SERPL-SCNC: 33 MMOL/L (ref 21–32)
CREAT SERPL-MCNC: 0.9 MG/DL (ref 0.6–1)
GLUCOSE SERPL-MCNC: 98 MG/DL (ref 65–100)
MAGNESIUM SERPL-MCNC: 2.3 MG/DL (ref 1.8–2.4)
POTASSIUM SERPL-SCNC: 4 MMOL/L (ref 3.5–5.1)
SODIUM SERPL-SCNC: 139 MMOL/L (ref 136–146)

## 2024-01-26 RX ORDER — POTASSIUM CHLORIDE 20 MEQ/1
20 TABLET, EXTENDED RELEASE ORAL DAILY
Qty: 30 TABLET | Refills: 11 | Status: SHIPPED | OUTPATIENT
Start: 2024-01-26

## 2024-01-29 ENCOUNTER — TELEPHONE (OUTPATIENT)
Age: 89
End: 2024-01-29

## 2024-01-29 RX ORDER — RIVAROXABAN 15 MG/1
TABLET, FILM COATED ORAL
Qty: 90 TABLET | Refills: 3 | Status: SHIPPED | OUTPATIENT
Start: 2024-01-29

## 2024-01-29 NOTE — TELEPHONE ENCOUNTER
----- Message from Hieu Smith MD sent at 1/28/2024  3:48 PM EST -----  No major abnormalities.  Continue current meds without change.

## 2024-01-29 NOTE — TELEPHONE ENCOUNTER
Requested Prescriptions     Pending Prescriptions Disp Refills    XARELTO 15 MG TABS tablet [Pharmacy Med Name: XARELTO 15 MG Tablet] 90 tablet 3     Sig: TAKE 1 TABLET EVERY DAY WITH BREAKFAST        13-Jul-2023

## 2024-03-28 RX ORDER — FUROSEMIDE 40 MG/1
40 TABLET ORAL DAILY
Qty: 90 TABLET | Refills: 3 | Status: SHIPPED | OUTPATIENT
Start: 2024-03-28

## 2024-03-28 NOTE — TELEPHONE ENCOUNTER
Requested Prescriptions     Pending Prescriptions Disp Refills    furosemide (LASIX) 40 MG tablet 90 tablet 3     Sig: Take 1 tablet by mouth daily     Verified rx. Last OV 01/26/24. Erx to pharm on file.

## 2024-04-08 RX ORDER — POTASSIUM CHLORIDE 20 MEQ/1
TABLET, EXTENDED RELEASE ORAL
Qty: 90 TABLET | Refills: 0 | OUTPATIENT
Start: 2024-04-08

## 2024-04-21 NOTE — PROGRESS NOTES
Lab Results   Component Value Date    CHOL 136 07/12/2021    CHOL 159 08/11/2020     Lab Results   Component Value Date    TRIG 194 (H) 07/12/2021    TRIG 102 08/11/2020     Lab Results   Component Value Date    HDL 60 07/12/2021    HDL 71 (H) 08/11/2020     Lab Results   Component Value Date    LDLCALC 37.2 07/12/2021    LDLCALC 67.6 08/11/2020     No results found for: \"VLDL\"    Lab Results   Component Value Date    CHOLHDLRATIO 2.3 07/12/2021    CHOLHDLRATIO 2.2 08/11/2020        Lab Results   Component Value Date/Time     01/26/2024 12:52 PM    K 4.0 01/26/2024 12:52 PM     01/26/2024 12:52 PM    CO2 33 01/26/2024 12:52 PM    BUN 20 01/26/2024 12:52 PM    CREATININE 0.90 01/26/2024 12:52 PM    GLUCOSE 98 01/26/2024 12:52 PM    CALCIUM 9.7 01/26/2024 12:52 PM   Creatinine clearance calculated at 43 based on most recent creatinine in care everywhere    No results for input(s): \"WBC\", \"HGB\", \"HCT\", \"MCV\", \"PLT\" in the last 720 hours.     No results found for: \"LABA1C\"  No results found for: \"EAG\"     No results found for: \"BNP\"     Lab Results   Component Value Date    TSH 2.930 09/24/2019        No results found for any visits on 04/26/24.           ASSESSMENT and PLAN     Diagnoses and all orders for this visit:      Takotsubo cardiomyopathy-  echo as noted above.  Clinically euvolemic and medically maximized at last visit but now volume overloaded.  Recheck echo as noted above.      Coronary artery disease involving native coronary artery of native heart with dyspnea on exertion and abnormal nuclear perfusion anteroapically.  False positive nuclear stress test as noted above.  Minimal coronary irregularities and normal left ventricular  function.  Search for alternative etiology for patient's chest pain if it recurs.      Mitral valve prolapse- benign exam, systolic bowing of MV anterior leaflet without prolapse now, mild to moderate MR by echo-see above      Aortic regurgitation- moderate at

## 2024-04-26 ENCOUNTER — TELEPHONE (OUTPATIENT)
Age: 89
End: 2024-04-26

## 2024-04-26 ENCOUNTER — OFFICE VISIT (OUTPATIENT)
Age: 89
End: 2024-04-26
Payer: MEDICARE

## 2024-04-26 VITALS
SYSTOLIC BLOOD PRESSURE: 104 MMHG | WEIGHT: 124.6 LBS | DIASTOLIC BLOOD PRESSURE: 60 MMHG | HEIGHT: 64 IN | BODY MASS INDEX: 21.27 KG/M2 | HEART RATE: 72 BPM

## 2024-04-26 DIAGNOSIS — I25.119 CORONARY ARTERY DISEASE INVOLVING NATIVE CORONARY ARTERY OF NATIVE HEART WITH ANGINA PECTORIS (HCC): Primary | ICD-10-CM

## 2024-04-26 DIAGNOSIS — I48.19 PERSISTENT ATRIAL FIBRILLATION (HCC): ICD-10-CM

## 2024-04-26 DIAGNOSIS — I34.1 MITRAL VALVE PROLAPSE: ICD-10-CM

## 2024-04-26 DIAGNOSIS — I35.1 NONRHEUMATIC AORTIC VALVE INSUFFICIENCY: ICD-10-CM

## 2024-04-26 DIAGNOSIS — I34.0 NON-RHEUMATIC MITRAL REGURGITATION: ICD-10-CM

## 2024-04-26 DIAGNOSIS — I25.119 CORONARY ARTERY DISEASE INVOLVING NATIVE CORONARY ARTERY OF NATIVE HEART WITH ANGINA PECTORIS (HCC): ICD-10-CM

## 2024-04-26 DIAGNOSIS — I51.81 TAKOTSUBO CARDIOMYOPATHY: ICD-10-CM

## 2024-04-26 DIAGNOSIS — I49.3 PVC (PREMATURE VENTRICULAR CONTRACTION): ICD-10-CM

## 2024-04-26 LAB
ANION GAP SERPL CALC-SCNC: 8 MMOL/L (ref 9–18)
BUN SERPL-MCNC: 20 MG/DL (ref 8–23)
CALCIUM SERPL-MCNC: 9.6 MG/DL (ref 8.8–10.2)
CHLORIDE SERPL-SCNC: 100 MMOL/L (ref 98–107)
CO2 SERPL-SCNC: 30 MMOL/L (ref 20–28)
CREAT SERPL-MCNC: 0.88 MG/DL (ref 0.6–1.1)
GLUCOSE SERPL-MCNC: 91 MG/DL (ref 70–99)
MAGNESIUM SERPL-MCNC: 1.9 MG/DL (ref 1.8–2.4)
POTASSIUM SERPL-SCNC: 4.5 MMOL/L (ref 3.5–5.1)
SODIUM SERPL-SCNC: 138 MMOL/L (ref 136–145)

## 2024-04-26 PROCEDURE — 1090F PRES/ABSN URINE INCON ASSESS: CPT | Performed by: INTERNAL MEDICINE

## 2024-04-26 PROCEDURE — G8420 CALC BMI NORM PARAMETERS: HCPCS | Performed by: INTERNAL MEDICINE

## 2024-04-26 PROCEDURE — 99214 OFFICE O/P EST MOD 30 MIN: CPT | Performed by: INTERNAL MEDICINE

## 2024-04-26 PROCEDURE — 1123F ACP DISCUSS/DSCN MKR DOCD: CPT | Performed by: INTERNAL MEDICINE

## 2024-04-26 PROCEDURE — 1036F TOBACCO NON-USER: CPT | Performed by: INTERNAL MEDICINE

## 2024-04-26 PROCEDURE — G8427 DOCREV CUR MEDS BY ELIG CLIN: HCPCS | Performed by: INTERNAL MEDICINE

## 2024-04-26 NOTE — TELEPHONE ENCOUNTER
----- Message from Hieu Smith MD sent at 4/26/2024  4:47 PM EDT -----  No major abnormalities.  Continue current meds without change and keep routine followup.

## 2024-06-11 RX ORDER — ATORVASTATIN CALCIUM 10 MG/1
TABLET, FILM COATED ORAL
Qty: 90 TABLET | Refills: 3 | Status: SHIPPED | OUTPATIENT
Start: 2024-06-11

## 2024-06-11 RX ORDER — CARVEDILOL 3.12 MG/1
TABLET ORAL
Qty: 180 TABLET | Refills: 3 | Status: SHIPPED | OUTPATIENT
Start: 2024-06-11

## 2024-06-11 NOTE — TELEPHONE ENCOUNTER
Requested Prescriptions     Pending Prescriptions Disp Refills    carvedilol (COREG) 3.125 MG tablet [Pharmacy Med Name: CARVEDILOL 3.125 MG Tablet] 180 tablet 3     Sig: TAKE 1 TABLET TWICE DAILY WITH MEALS    atorvastatin (LIPITOR) 10 MG tablet [Pharmacy Med Name: ATORVASTATIN CALCIUM 10 MG Tablet] 90 tablet 3     Sig: TAKE 1 TABLET EVERY DAY       Verified rx. Last OV 04/26/24. Erx to pharm on file.

## 2024-06-20 RX ORDER — POTASSIUM CHLORIDE 20 MEQ/1
20 TABLET, EXTENDED RELEASE ORAL DAILY
Qty: 90 TABLET | Refills: 3 | Status: SHIPPED | OUTPATIENT
Start: 2024-06-20

## 2024-06-20 NOTE — TELEPHONE ENCOUNTER
Requested Prescriptions     Pending Prescriptions Disp Refills    potassium chloride (KLOR-CON M) 20 MEQ extended release tablet [Pharmacy Med Name: POTASSIUM CL ER 20MEQ MICRO TAB] 90 tablet 3     Sig: Take 1 tablet by mouth daily     Verified rx. Last OV 04/26/24. Erx to pharm on file.

## 2024-10-27 NOTE — PROGRESS NOTES
[facility] [place] CARDIOLOGY  [address] [place] [address], [address]  [place] [address]  PHONE: [phone]        NAME:  Makenna Salinas  : 1935  MRN: [id number]     PCP:  Laisha Priest MD      SUBJECTIVE:   Makenna Salinas is a 89 y.o. female seen for a follow up visit regarding the following:     Chief Complaint   Patient presents with    Coronary Artery Disease    Follow-up       HPI:    She presented recently with a 10 to 14 pound weight gain, worsening exertional dyspnea, elevated JVD and severe lower extremity edema. She has had Takotsubo CM at least twice in the past, with LHC 6 years ago showing minimal coronary irregularities at most.       Vitals otherwise controlled and tolerating meds well.  She reports increasing issues with balance and gait stability, but denies any interval falls or injury.  She is using her walker see religiously and staying well-hydrated without any postural symptoms recently.       She has no history of palpitations prior to or since her admission to [facility] and no prior history of atrial fibrillation but subsequent Holter monitor demonstrated burst of atrial tachycardia and probable burst of atrial flutter lasting up to and greater than 1 hour at a time.  She has an elevated [id number] score and has been tolerating Xarelto very well with no interval bleeding or medication intolerance whatsoever.      She had a Takotsubo CM with acute CHF in  after the death of a loved one, and another episode acutely a few years ago, similar situation.  LHC both times showed minimal non-obstructive CAD and EF recovered nicely to 45-50% with very mild anteroapical  HK.  She has mild mitral valve prolapse and dyslipidemia as well.      Heart cath 2/15/2022:    Very mild nonobstructive coronary disease-false positive nuclear stress test    Low normal ejection fraction    Search for alternative etiology for the patient's recurrent chest pain      Doing well since

## 2024-10-29 ENCOUNTER — OFFICE VISIT (OUTPATIENT)
Age: 89
End: 2024-10-29

## 2024-10-29 VITALS
DIASTOLIC BLOOD PRESSURE: 72 MMHG | BODY MASS INDEX: 20.03 KG/M2 | WEIGHT: 120.2 LBS | HEIGHT: 65 IN | SYSTOLIC BLOOD PRESSURE: 124 MMHG | HEART RATE: 76 BPM

## 2024-10-29 DIAGNOSIS — I34.0 NON-RHEUMATIC MITRAL REGURGITATION: ICD-10-CM

## 2024-10-29 DIAGNOSIS — I34.1 MITRAL VALVE PROLAPSE: ICD-10-CM

## 2024-10-29 DIAGNOSIS — I51.81 TAKOTSUBO CARDIOMYOPATHY: ICD-10-CM

## 2024-10-29 DIAGNOSIS — I49.3 PVC (PREMATURE VENTRICULAR CONTRACTION): ICD-10-CM

## 2024-10-29 DIAGNOSIS — I35.1 NONRHEUMATIC AORTIC VALVE INSUFFICIENCY: ICD-10-CM

## 2024-10-29 DIAGNOSIS — I25.119 CORONARY ARTERY DISEASE INVOLVING NATIVE CORONARY ARTERY OF NATIVE HEART WITH ANGINA PECTORIS (HCC): Primary | ICD-10-CM

## 2024-10-31 ENCOUNTER — APPOINTMENT (OUTPATIENT)
Dept: CT IMAGING | Age: 89
End: 2024-10-31
Payer: MEDICARE

## 2024-10-31 ENCOUNTER — HOSPITAL ENCOUNTER (EMERGENCY)
Age: 89
Discharge: HOME | End: 2024-10-31
Attending: STUDENT IN AN ORGANIZED HEALTH CARE EDUCATION/TRAINING PROGRAM
Payer: MEDICARE

## 2024-10-31 VITALS
BODY MASS INDEX: 20.02 KG/M2 | OXYGEN SATURATION: 93 % | WEIGHT: 120.15 LBS | RESPIRATION RATE: 15 BRPM | TEMPERATURE: 98.2 F | HEIGHT: 65 IN | SYSTOLIC BLOOD PRESSURE: 129 MMHG | HEART RATE: 86 BPM | DIASTOLIC BLOOD PRESSURE: 80 MMHG

## 2024-10-31 DIAGNOSIS — R10.32 LEFT LOWER QUADRANT ABDOMINAL PAIN: Primary | ICD-10-CM

## 2024-10-31 LAB
ALBUMIN SERPL-MCNC: 3.6 G/DL (ref 3.2–4.6)
ALBUMIN/GLOB SERPL: 1.1 (ref 1–1.9)
ALP SERPL-CCNC: 87 U/L (ref 35–104)
ALT SERPL-CCNC: 16 U/L (ref 8–45)
ANION GAP SERPL CALC-SCNC: 9 MMOL/L (ref 7–16)
APPEARANCE UR: CLEAR
AST SERPL-CCNC: 33 U/L (ref 15–37)
BACTERIA URNS QL MICRO: NEGATIVE /HPF
BASOPHILS # BLD: 0 K/UL (ref 0–0.2)
BASOPHILS NFR BLD: 1 % (ref 0–2)
BILIRUB SERPL-MCNC: 1 MG/DL (ref 0–1.2)
BILIRUB UR QL: NEGATIVE
BUN SERPL-MCNC: 12 MG/DL (ref 8–23)
CALCIUM SERPL-MCNC: 9.6 MG/DL (ref 8.8–10.2)
CASTS URNS QL MICRO: 0 /LPF
CHLORIDE SERPL-SCNC: 104 MMOL/L (ref 98–107)
CO2 SERPL-SCNC: 25 MMOL/L (ref 20–29)
COLOR UR: ABNORMAL
CREAT SERPL-MCNC: 0.75 MG/DL (ref 0.6–1.1)
CRYSTALS URNS QL MICRO: 0 /LPF
DIFFERENTIAL METHOD BLD: ABNORMAL
EOSINOPHIL # BLD: 0.2 K/UL (ref 0–0.8)
EOSINOPHIL NFR BLD: 4 % (ref 0.5–7.8)
EPI CELLS #/AREA URNS HPF: ABNORMAL /HPF
ERYTHROCYTE [DISTWIDTH] IN BLOOD BY AUTOMATED COUNT: 12.3 % (ref 11.9–14.6)
GLOBULIN SER CALC-MCNC: 3.3 G/DL (ref 2.3–3.5)
GLUCOSE SERPL-MCNC: 97 MG/DL (ref 70–99)
GLUCOSE UR STRIP.AUTO-MCNC: NEGATIVE MG/DL
HCT VFR BLD AUTO: 41.8 % (ref 35.8–46.3)
HGB BLD-MCNC: 14 G/DL (ref 11.7–15.4)
HGB UR QL STRIP: NEGATIVE
HYALINE CASTS URNS QL MICRO: ABNORMAL /LPF
IMM GRANULOCYTES # BLD AUTO: 0 K/UL (ref 0–0.5)
IMM GRANULOCYTES NFR BLD AUTO: 0 % (ref 0–5)
KETONES UR QL STRIP.AUTO: NEGATIVE MG/DL
LEUKOCYTE ESTERASE UR QL STRIP.AUTO: ABNORMAL
LIPASE SERPL-CCNC: 52 U/L (ref 13–60)
LYMPHOCYTES # BLD: 1 K/UL (ref 0.5–4.6)
LYMPHOCYTES NFR BLD: 20 % (ref 13–44)
MCH RBC QN AUTO: 32.6 PG (ref 26.1–32.9)
MCHC RBC AUTO-ENTMCNC: 33.5 G/DL (ref 31.4–35)
MCV RBC AUTO: 97.4 FL (ref 82–102)
MONOCYTES # BLD: 0.5 K/UL (ref 0.1–1.3)
MONOCYTES NFR BLD: 10 % (ref 4–12)
MUCOUS THREADS URNS QL MICRO: 0 /LPF
NEUTS SEG # BLD: 3.2 K/UL (ref 1.7–8.2)
NEUTS SEG NFR BLD: 65 % (ref 43–78)
NITRITE UR QL STRIP.AUTO: NEGATIVE
NRBC # BLD: 0 K/UL (ref 0–0.2)
PH UR STRIP: 6.5 (ref 5–9)
PLATELET # BLD AUTO: 115 K/UL (ref 150–450)
PMV BLD AUTO: 9.4 FL (ref 9.4–12.3)
POTASSIUM SERPL-SCNC: 4.5 MMOL/L (ref 3.5–5.1)
PROT SERPL-MCNC: 6.9 G/DL (ref 6.3–8.2)
PROT UR STRIP-MCNC: NEGATIVE MG/DL
RBC # BLD AUTO: 4.29 M/UL (ref 4.05–5.2)
RBC #/AREA URNS HPF: ABNORMAL /HPF
SODIUM SERPL-SCNC: 138 MMOL/L (ref 136–145)
SP GR UR REFRACTOMETRY: 1.01 (ref 1–1.02)
URINE CULTURE IF INDICATED: ABNORMAL
UROBILINOGEN UR QL STRIP.AUTO: 0.2 EU/DL (ref 0.2–1)
WBC # BLD AUTO: 4.9 K/UL (ref 4.3–11.1)
WBC URNS QL MICRO: ABNORMAL /HPF

## 2024-10-31 PROCEDURE — 99285 EMERGENCY DEPT VISIT HI MDM: CPT

## 2024-10-31 PROCEDURE — 83690 ASSAY OF LIPASE: CPT

## 2024-10-31 PROCEDURE — 80053 COMPREHEN METABOLIC PANEL: CPT

## 2024-10-31 PROCEDURE — 85025 COMPLETE CBC W/AUTO DIFF WBC: CPT

## 2024-10-31 PROCEDURE — 81001 URINALYSIS AUTO W/SCOPE: CPT

## 2024-10-31 PROCEDURE — 74177 CT ABD & PELVIS W/CONTRAST: CPT

## 2024-10-31 PROCEDURE — 6360000004 HC RX CONTRAST MEDICATION: Performed by: STUDENT IN AN ORGANIZED HEALTH CARE EDUCATION/TRAINING PROGRAM

## 2024-10-31 RX ORDER — IOPAMIDOL 755 MG/ML
100 INJECTION, SOLUTION INTRAVASCULAR
Status: COMPLETED | OUTPATIENT
Start: 2024-10-31 | End: 2024-10-31

## 2024-10-31 RX ADMIN — IOPAMIDOL 100 ML: 755 INJECTION, SOLUTION INTRAVENOUS at 14:02

## 2024-10-31 ASSESSMENT — LIFESTYLE VARIABLES
HOW OFTEN DO YOU HAVE A DRINK CONTAINING ALCOHOL: NEVER
HOW MANY STANDARD DRINKS CONTAINING ALCOHOL DO YOU HAVE ON A TYPICAL DAY: PATIENT DOES NOT DRINK

## 2024-10-31 ASSESSMENT — PAIN - FUNCTIONAL ASSESSMENT: PAIN_FUNCTIONAL_ASSESSMENT: 0-10

## 2024-10-31 ASSESSMENT — PAIN SCALES - GENERAL: PAINLEVEL_OUTOF10: 3

## 2024-10-31 NOTE — ED TRIAGE NOTES
Pt arrives via POV coming from urgent care after being seen her for an abd CT. Pain started several days ago.

## 2024-10-31 NOTE — DISCHARGE INSTRUCTIONS
Take stool softeners daily.  Take Tylenol as needed for discomfort.  Follow-up with primary care physician within 1 week.  Return to the ER for worsening or worrisome symptoms.

## 2024-10-31 NOTE — ED PROVIDER NOTES
Emergency Department Provider Note       PCP: Laisha Priest MD   Age: 89 y.o.   Sex: female     DISPOSITION Decision To Discharge 10/31/2024 03:48:11 PM    ICD-10-CM    1. Left lower quadrant abdominal pain  R10.32           Medical Decision Making     89-year-old female presents to the emergency department complaining of abdominal discomfort and bloating that has been ongoing for the past few days.  Reports nausea but no vomiting.  Denies fevers or chills.  States she does have history of constipation but had her first normal bowel movement in a few days this morning.  Denies dysuria.  Was seen in urgent care who told her to come to the ER to get a CT scan.  Lab work obtained here showed no significant findings.  Normal CBC, CMP, lipase, UA.  CT scan was performed since that is what she was sent here for.   CT scan does not reveal any emergent findings, did note enlarged liver.  Patient's discomfort is along the left lower and lateral aspect of her abdomen and nowhere near her liver.  Do not feel the patient has SBP either she is not peritoneal.  Offered Levsin but she declined a prescription of this.  States she would just continue taking Tylenol as needed.  She was encouraged to reserve Tylenol for when she is actually in pain.  She stated that she has been taking Tylenol for sleep every night for many years.  Recommend close outpatient follow-up and return precautions.  She voiced understanding and agreement.        1 or more acute illnesses that pose a threat to life or bodily function.   Over the counter drug management performed.  Shared medical decision making was utilized in creating the patients health plan today.  I independently ordered and reviewed each unique test.    I reviewed external records: provider visit note from PCP.       I interpreted the CT Scan no pneumoperitoneum.              History     89-year-old female presents to the emergency department complaining of abdominal

## 2025-02-04 RX ORDER — RIVAROXABAN 15 MG/1
TABLET, FILM COATED ORAL
Qty: 90 TABLET | Refills: 3 | Status: SHIPPED | OUTPATIENT
Start: 2025-02-04

## 2025-02-04 NOTE — TELEPHONE ENCOUNTER
Requested Prescriptions     Pending Prescriptions Disp Refills    XARELTO 15 MG TABS tablet [Pharmacy Med Name: Xarelto Oral Tablet 15 MG] 90 tablet 3     Sig: TAKE 1 TABLET EVERY DAY WITH BREAKFAST       Verified rx. Last OV 10/29/24 Erx to pharm on file.

## 2025-02-24 RX ORDER — FUROSEMIDE 40 MG/1
40 TABLET ORAL DAILY
Qty: 90 TABLET | Refills: 3 | Status: SHIPPED | OUTPATIENT
Start: 2025-02-24

## 2025-02-24 NOTE — TELEPHONE ENCOUNTER
Requested Prescriptions     Pending Prescriptions Disp Refills    furosemide (LASIX) 40 MG tablet [Pharmacy Med Name: Furosemide Oral Tablet 40 MG] 90 tablet 3     Sig: TAKE 1 TABLET EVERY DAY       Verified rx. Last OV 10/29/24. Erx to pharm on file.

## 2025-04-06 NOTE — PROGRESS NOTES
Results   Component Value Date    HDL 60 07/12/2021    HDL 71 (H) 08/11/2020     No components found for: \"LDLCHOLESTEROL\", \"LDLCALC\"    Lab Results   Component Value Date    VLDL 38.8 (H) 07/12/2021    VLDL 20.4 08/11/2020       Lab Results   Component Value Date    CHOLHDLRATIO 2.3 07/12/2021    CHOLHDLRATIO 2.2 08/11/2020        Lab Results   Component Value Date/Time     10/31/2024 11:52 AM    K 4.5 10/31/2024 11:52 AM     10/31/2024 11:52 AM    CO2 25 10/31/2024 11:52 AM    BUN 12 10/31/2024 11:52 AM    CREATININE 0.75 10/31/2024 11:52 AM    GLUCOSE 97 10/31/2024 11:52 AM    CALCIUM 9.6 10/31/2024 11:52 AM   Creatinine clearance calculated at 43 based on most recent creatinine in care everywhere    No results for input(s): \"WBC\", \"HGB\", \"HCT\", \"MCV\", \"PLT\" in the last 720 hours.     No results found for: \"LABA1C\"  No results found for: \"EAG\"     No results found for: \"BNP\"     Lab Results   Component Value Date    TSH 2.930 09/24/2019        Results for orders placed or performed in visit on 04/08/25   EKG 12 lead    Impression    Atrial fibrillation 97bpm  Rightward axis  -Right axis -consider right ventricular hypertrophy.  -Nonspecific T-abnormality.              ASSESSMENT and PLAN     Diagnoses and all orders for this visit:      Takotsubo cardiomyopathy-  echo as noted above.  Clinically euvolemic and medically maximized at last visit but now volume overloaded.  Recheck echo soon.      Coronary artery disease involving native coronary artery of native heart with dyspnea on exertion and abnormal nuclear perfusion anteroapically.  False positive nuclear stress test as noted above.  Minimal coronary irregularities and normal left ventricular  function.  Search for alternative etiology for patient's chest pain if it recurs.      Mitral valve prolapse- benign exam, systolic bowing of MV anterior leaflet without prolapse now, mild to moderate MR by echo-see above-repeat echo soon.      Aortic

## 2025-04-08 ENCOUNTER — OFFICE VISIT (OUTPATIENT)
Age: 89
End: 2025-04-08
Payer: MEDICARE

## 2025-04-08 VITALS
BODY MASS INDEX: 20.03 KG/M2 | HEART RATE: 97 BPM | DIASTOLIC BLOOD PRESSURE: 88 MMHG | WEIGHT: 120.2 LBS | HEIGHT: 65 IN | SYSTOLIC BLOOD PRESSURE: 132 MMHG

## 2025-04-08 DIAGNOSIS — E78.5 DYSLIPIDEMIA: ICD-10-CM

## 2025-04-08 DIAGNOSIS — I34.0 NON-RHEUMATIC MITRAL REGURGITATION: ICD-10-CM

## 2025-04-08 DIAGNOSIS — I49.3 PVC (PREMATURE VENTRICULAR CONTRACTION): ICD-10-CM

## 2025-04-08 DIAGNOSIS — I51.81 TAKOTSUBO CARDIOMYOPATHY: ICD-10-CM

## 2025-04-08 DIAGNOSIS — I34.1 MITRAL VALVE PROLAPSE: ICD-10-CM

## 2025-04-08 DIAGNOSIS — I35.1 NONRHEUMATIC AORTIC VALVE INSUFFICIENCY: ICD-10-CM

## 2025-04-08 DIAGNOSIS — I25.119 CORONARY ARTERY DISEASE INVOLVING NATIVE CORONARY ARTERY OF NATIVE HEART WITH ANGINA PECTORIS: Primary | ICD-10-CM

## 2025-04-08 PROCEDURE — 1036F TOBACCO NON-USER: CPT | Performed by: INTERNAL MEDICINE

## 2025-04-08 PROCEDURE — 1090F PRES/ABSN URINE INCON ASSESS: CPT | Performed by: INTERNAL MEDICINE

## 2025-04-08 PROCEDURE — 1123F ACP DISCUSS/DSCN MKR DOCD: CPT | Performed by: INTERNAL MEDICINE

## 2025-04-08 PROCEDURE — 93000 ELECTROCARDIOGRAM COMPLETE: CPT | Performed by: INTERNAL MEDICINE

## 2025-04-08 PROCEDURE — 1160F RVW MEDS BY RX/DR IN RCRD: CPT | Performed by: INTERNAL MEDICINE

## 2025-04-08 PROCEDURE — 99214 OFFICE O/P EST MOD 30 MIN: CPT | Performed by: INTERNAL MEDICINE

## 2025-04-08 PROCEDURE — 1159F MED LIST DOCD IN RCRD: CPT | Performed by: INTERNAL MEDICINE

## 2025-04-08 PROCEDURE — G8427 DOCREV CUR MEDS BY ELIG CLIN: HCPCS | Performed by: INTERNAL MEDICINE

## 2025-04-08 PROCEDURE — G8420 CALC BMI NORM PARAMETERS: HCPCS | Performed by: INTERNAL MEDICINE

## 2025-04-08 ASSESSMENT — ENCOUNTER SYMPTOMS: SHORTNESS OF BREATH: 1

## 2025-04-15 ENCOUNTER — TELEPHONE (OUTPATIENT)
Age: 89
End: 2025-04-15

## 2025-04-15 DIAGNOSIS — I25.119 CORONARY ARTERY DISEASE INVOLVING NATIVE CORONARY ARTERY OF NATIVE HEART WITH ANGINA PECTORIS: ICD-10-CM

## 2025-04-15 DIAGNOSIS — I51.81 TAKOTSUBO CARDIOMYOPATHY: ICD-10-CM

## 2025-04-15 DIAGNOSIS — I49.3 PVC (PREMATURE VENTRICULAR CONTRACTION): ICD-10-CM

## 2025-04-15 DIAGNOSIS — I34.0 NON-RHEUMATIC MITRAL REGURGITATION: Primary | ICD-10-CM

## 2025-04-15 DIAGNOSIS — I34.0 NON-RHEUMATIC MITRAL REGURGITATION: ICD-10-CM

## 2025-04-15 LAB
ANION GAP SERPL CALC-SCNC: 10 MMOL/L (ref 7–16)
BUN SERPL-MCNC: 23 MG/DL (ref 8–23)
CALCIUM SERPL-MCNC: 10 MG/DL (ref 8.8–10.2)
CHLORIDE SERPL-SCNC: 100 MMOL/L (ref 98–107)
CO2 SERPL-SCNC: 28 MMOL/L (ref 20–29)
CREAT SERPL-MCNC: 0.97 MG/DL (ref 0.6–1.1)
GLUCOSE SERPL-MCNC: 160 MG/DL (ref 70–99)
MAGNESIUM SERPL-MCNC: 2 MG/DL (ref 1.8–2.4)
POTASSIUM SERPL-SCNC: 4.5 MMOL/L (ref 3.5–5.1)
SODIUM SERPL-SCNC: 138 MMOL/L (ref 136–145)

## 2025-04-15 NOTE — TELEPHONE ENCOUNTER
Cherise with [facility] called stating she has the following needs :    Lab Orders    Please call / fax orders.

## 2025-04-16 ENCOUNTER — RESULTS FOLLOW-UP (OUTPATIENT)
Dept: CARDIOLOGY CLINIC | Age: 89
End: 2025-04-16

## 2025-04-16 NOTE — TELEPHONE ENCOUNTER
----- Message from Dr. Hieu Smith MD sent at 4/16/2025  7:23 AM EDT -----  No major abnormalities.  Continue current meds without change and keep routine followup.

## 2025-05-04 NOTE — PROGRESS NOTES
empagliflozin (JARDIANCE) 10 MG tablet Take 1 tablet by mouth daily 30 tablet 11    spironolactone (ALDACTONE) 25 MG tablet Take 1 tablet by mouth daily 30 tablet 11    carvedilol (COREG) 6.25 MG tablet Take 1 tablet by mouth 2 times daily (with meals) 180 tablet 3    furosemide (LASIX) 40 MG tablet TAKE 1 TABLET EVERY DAY 90 tablet 3    XARELTO 15 MG TABS tablet TAKE 1 TABLET EVERY DAY WITH BREAKFAST 90 tablet 3    atorvastatin (LIPITOR) 10 MG tablet TAKE 1 TABLET EVERY DAY 90 tablet 3    Calcium Carbonate-Vit D-Min (CALTRATE PLUS PO) Take by mouth      ascorbic acid (VITAMIN C) 500 MG tablet 1 tablet daily      vitamin D3 (CHOLECALCIFEROL) 125 MCG (5000 UT) TABS tablet Take 1 tablet by mouth daily       No current facility-administered medications for this visit.            No Known Allergies      Patient Active Problem List    Diagnosis Date Noted    Abnormal nuclear cardiac imaging test 01/27/2022     Priority: Low     Overview Note:     Added automatically from request for surgery [id number]        Balance problems 10/18/2021     Priority: Low    Amaurosis fugax of left eye 04/21/2021     Priority: Low    Atypical chest pain 04/21/2021     Priority: Low    Non-rheumatic mitral regurgitation 04/24/2019     Priority: Low    PVC (premature ventricular contraction) 04/24/2019     Priority: Low    Nonrheumatic aortic valve insufficiency 04/24/2019     Priority: Low    Takotsubo cardiomyopathy 02/01/2017     Priority: Low    Coronary artery disease involving native coronary artery of native heart with angina pectoris 02/01/2017     Priority: Low    Dyslipidemia 02/01/2017     Priority: Low    Mitral valve prolapse 02/01/2017     Priority: Low    Unspecified systolic (congestive) heart failure (HCC) 01/17/2017     Priority: Low    Partial small bowel obstruction (HCC) 03/27/2013     Priority: Low        Past Surgical History:   Procedure Laterality Date    CARDIAC CATHETERIZATION  2005    GYN      ORTHOPEDIC SURGERY

## 2025-05-06 ENCOUNTER — OFFICE VISIT (OUTPATIENT)
Age: 89
End: 2025-05-06
Payer: MEDICARE

## 2025-05-06 VITALS
WEIGHT: 119.7 LBS | DIASTOLIC BLOOD PRESSURE: 64 MMHG | SYSTOLIC BLOOD PRESSURE: 108 MMHG | HEART RATE: 81 BPM | BODY MASS INDEX: 19.94 KG/M2 | HEIGHT: 65 IN

## 2025-05-06 DIAGNOSIS — I51.81 TAKOTSUBO CARDIOMYOPATHY: ICD-10-CM

## 2025-05-06 DIAGNOSIS — I49.3 PVC (PREMATURE VENTRICULAR CONTRACTION): ICD-10-CM

## 2025-05-06 DIAGNOSIS — I34.1 MITRAL VALVE PROLAPSE: ICD-10-CM

## 2025-05-06 DIAGNOSIS — I25.119 CORONARY ARTERY DISEASE INVOLVING NATIVE CORONARY ARTERY OF NATIVE HEART WITH ANGINA PECTORIS: Primary | ICD-10-CM

## 2025-05-06 DIAGNOSIS — I34.0 NON-RHEUMATIC MITRAL REGURGITATION: ICD-10-CM

## 2025-05-06 DIAGNOSIS — I35.1 NONRHEUMATIC AORTIC VALVE INSUFFICIENCY: ICD-10-CM

## 2025-05-06 PROCEDURE — 1159F MED LIST DOCD IN RCRD: CPT | Performed by: INTERNAL MEDICINE

## 2025-05-06 PROCEDURE — G8420 CALC BMI NORM PARAMETERS: HCPCS | Performed by: INTERNAL MEDICINE

## 2025-05-06 PROCEDURE — 1090F PRES/ABSN URINE INCON ASSESS: CPT | Performed by: INTERNAL MEDICINE

## 2025-05-06 PROCEDURE — 1126F AMNT PAIN NOTED NONE PRSNT: CPT | Performed by: INTERNAL MEDICINE

## 2025-05-06 PROCEDURE — 1123F ACP DISCUSS/DSCN MKR DOCD: CPT | Performed by: INTERNAL MEDICINE

## 2025-05-06 PROCEDURE — 1036F TOBACCO NON-USER: CPT | Performed by: INTERNAL MEDICINE

## 2025-05-06 PROCEDURE — G8427 DOCREV CUR MEDS BY ELIG CLIN: HCPCS | Performed by: INTERNAL MEDICINE

## 2025-05-06 PROCEDURE — 99214 OFFICE O/P EST MOD 30 MIN: CPT | Performed by: INTERNAL MEDICINE

## 2025-05-06 PROCEDURE — 1160F RVW MEDS BY RX/DR IN RCRD: CPT | Performed by: INTERNAL MEDICINE

## 2025-05-06 RX ORDER — SPIRONOLACTONE 25 MG/1
25 TABLET ORAL DAILY
Qty: 30 TABLET | Refills: 11 | Status: SHIPPED | OUTPATIENT
Start: 2025-05-06

## 2025-05-06 RX ORDER — CARVEDILOL 6.25 MG/1
6.25 TABLET ORAL 2 TIMES DAILY WITH MEALS
Qty: 180 TABLET | Refills: 3 | Status: SHIPPED | OUTPATIENT
Start: 2025-05-06

## 2025-05-06 ASSESSMENT — ENCOUNTER SYMPTOMS: SHORTNESS OF BREATH: 0

## 2025-05-14 DIAGNOSIS — I34.1 MITRAL VALVE PROLAPSE: ICD-10-CM

## 2025-05-14 DIAGNOSIS — I34.0 NON-RHEUMATIC MITRAL REGURGITATION: ICD-10-CM

## 2025-05-14 DIAGNOSIS — I25.119 CORONARY ARTERY DISEASE INVOLVING NATIVE CORONARY ARTERY OF NATIVE HEART WITH ANGINA PECTORIS: ICD-10-CM

## 2025-05-14 DIAGNOSIS — I51.81 TAKOTSUBO CARDIOMYOPATHY: ICD-10-CM

## 2025-05-14 LAB
ANION GAP SERPL CALC-SCNC: 10 MMOL/L (ref 7–16)
BUN SERPL-MCNC: 23 MG/DL (ref 8–23)
CALCIUM SERPL-MCNC: 10.4 MG/DL (ref 8.8–10.2)
CHLORIDE SERPL-SCNC: 98 MMOL/L (ref 98–107)
CO2 SERPL-SCNC: 27 MMOL/L (ref 20–29)
CREAT SERPL-MCNC: 0.98 MG/DL (ref 0.6–1.1)
GLUCOSE SERPL-MCNC: 94 MG/DL (ref 70–99)
POTASSIUM SERPL-SCNC: 5 MMOL/L (ref 3.5–5.1)
SODIUM SERPL-SCNC: 135 MMOL/L (ref 136–145)

## 2025-05-15 ENCOUNTER — RESULTS FOLLOW-UP (OUTPATIENT)
Dept: CARDIOLOGY | Age: 89
End: 2025-05-15

## 2025-05-16 NOTE — TELEPHONE ENCOUNTER
----- Message from Dr. Hieu Smith MD sent at 5/15/2025  5:41 PM EDT -----  No major abnormalities.  Continue current meds without change and keep routine followup.

## 2025-05-19 NOTE — TELEPHONE ENCOUNTER
PT IS CALLING ASKING IF THE NURSE CAN CALL THEM BACK TOMORROW MORNING WITH RESULTS 5/20/25,PER PT  SHE IS LEAVING AND WILL BE HOME LATE PLEASE CALL TOMORROW

## 2025-05-24 NOTE — PROGRESS NOTES
[facility] CARDIOLOGY  [address] [place] [address], [address]  [place] [address]  PHONE: [phone]        NAME:  Makenna Salinas  : 1935  MRN: [id number]     PCP:  Laisha Priest MD      SUBJECTIVE:   Makenna Salinas is a 89 y.o. female seen for a follow up visit regarding the following:     Chief Complaint   Patient presents with    Coronary Artery Disease       HPI:    She has had Takotsubo CM at least twice in the past, with LHC 6 years ago showing minimal coronary irregularities at most.       She reports increasing issues with balance and gait stability, but denies any interval falls or injury.  She is using her walker see religiously and staying well-hydrated without any postural symptoms recently.      She had a Takotsubo CM with acute CHF in  after the death of a loved one, and another episode acutely a few years ago, similar situation.  LHC both times showed minimal non-obstructive CAD and EF recovered nicely to 45-50% with very mild anteroapical  HK.  She has mild mitral valve prolapse and dyslipidemia as well.      Heart cath 2/15/2022:    Very mild nonobstructive coronary disease-false positive nuclear stress test    Low normal ejection fraction    Search for alternative etiology for the patient's recurrent chest pain      Doing well since last visit without interval angina, CHF, palpitations, edema, presyncope or syncope.  Vitals controlled and tolerating meds well. Staying active without any significant limitations.   Creatinine clearance 43, decreased Xarelto dose to 15 mg nightly with food.    Echocardiogram 2024:  Left Ventricle Low normal left ventricular systolic function with a visually estimated EF of 50 - 55%. Left ventricle size is normal. Normal wall thickness. Normal wall motion. Diastolic function indeterminate in the setting of atrial fibrillation. Average E/e' ratio is 7.27.   Left Atrium Left atrium is moderately dilated.   Interatrial

## 2025-05-28 ENCOUNTER — OFFICE VISIT (OUTPATIENT)
Age: 89
End: 2025-05-28
Payer: MEDICARE

## 2025-05-28 VITALS
WEIGHT: 114 LBS | BODY MASS INDEX: 18.99 KG/M2 | HEIGHT: 65 IN | SYSTOLIC BLOOD PRESSURE: 104 MMHG | DIASTOLIC BLOOD PRESSURE: 64 MMHG | HEART RATE: 72 BPM

## 2025-05-28 DIAGNOSIS — I25.119 CORONARY ARTERY DISEASE INVOLVING NATIVE CORONARY ARTERY OF NATIVE HEART WITH ANGINA PECTORIS: Primary | ICD-10-CM

## 2025-05-28 DIAGNOSIS — I34.0 NON-RHEUMATIC MITRAL REGURGITATION: ICD-10-CM

## 2025-05-28 DIAGNOSIS — I51.81 TAKOTSUBO CARDIOMYOPATHY: ICD-10-CM

## 2025-05-28 DIAGNOSIS — I49.3 PVC (PREMATURE VENTRICULAR CONTRACTION): ICD-10-CM

## 2025-05-28 DIAGNOSIS — I34.1 MITRAL VALVE PROLAPSE: ICD-10-CM

## 2025-05-28 PROCEDURE — G8427 DOCREV CUR MEDS BY ELIG CLIN: HCPCS | Performed by: INTERNAL MEDICINE

## 2025-05-28 PROCEDURE — 1160F RVW MEDS BY RX/DR IN RCRD: CPT | Performed by: INTERNAL MEDICINE

## 2025-05-28 PROCEDURE — 1036F TOBACCO NON-USER: CPT | Performed by: INTERNAL MEDICINE

## 2025-05-28 PROCEDURE — 99214 OFFICE O/P EST MOD 30 MIN: CPT | Performed by: INTERNAL MEDICINE

## 2025-05-28 PROCEDURE — 1159F MED LIST DOCD IN RCRD: CPT | Performed by: INTERNAL MEDICINE

## 2025-05-28 PROCEDURE — G8420 CALC BMI NORM PARAMETERS: HCPCS | Performed by: INTERNAL MEDICINE

## 2025-05-28 PROCEDURE — 1123F ACP DISCUSS/DSCN MKR DOCD: CPT | Performed by: INTERNAL MEDICINE

## 2025-05-28 PROCEDURE — 1090F PRES/ABSN URINE INCON ASSESS: CPT | Performed by: INTERNAL MEDICINE

## 2025-05-28 RX ORDER — SPIRONOLACTONE 25 MG/1
25 TABLET ORAL DAILY
Qty: 90 TABLET | Refills: 3 | Status: SHIPPED | OUTPATIENT
Start: 2025-05-28

## 2025-06-02 RX ORDER — SPIRONOLACTONE 25 MG/1
25 TABLET ORAL DAILY
Qty: 90 TABLET | Refills: 0 | Status: SHIPPED | OUTPATIENT
Start: 2025-06-02 | End: 2025-06-04 | Stop reason: SDUPTHER

## 2025-06-02 NOTE — TELEPHONE ENCOUNTER
Needs a 7 day RX sent in for Jardiance and Spironolactone until she gets her mail order RX.   Please send to [facility]

## 2025-06-03 NOTE — TELEPHONE ENCOUNTER
Calling again asking about 1 week supply of Jardiance and Spironolactone. Dr. Sarah salter gave her a 30 day Rx from local pharmacy to try meds. The new Rx was sent to [facility]. Her meds have not arrived and she is almost out of meds. She is asking for a week supply or is it ok to go w/o meds until [facility] delivers?

## 2025-06-04 ENCOUNTER — TELEPHONE (OUTPATIENT)
Age: 89
End: 2025-06-04

## 2025-06-04 RX ORDER — SPIRONOLACTONE 25 MG/1
25 TABLET ORAL DAILY
Qty: 7 TABLET | Refills: 0 | Status: SHIPPED | OUTPATIENT
Start: 2025-06-04

## 2025-06-04 NOTE — TELEPHONE ENCOUNTER
Patient called back needs 7 days urgent RX of Jardiance, she is completely out to [facility] pharmacy on [facility]

## 2025-06-04 NOTE — TELEPHONE ENCOUNTER
Jardiance getting from mail order but needs a rx called in for 7 day to [facility] till mail order comes in. [phone].patient is totally out

## 2025-06-12 RX ORDER — SPIRONOLACTONE 25 MG/1
TABLET ORAL
Qty: 90 TABLET | Refills: 0 | OUTPATIENT
Start: 2025-06-12

## 2025-06-18 DIAGNOSIS — I25.119 CORONARY ARTERY DISEASE INVOLVING NATIVE CORONARY ARTERY OF NATIVE HEART WITH ANGINA PECTORIS: ICD-10-CM

## 2025-06-18 DIAGNOSIS — I49.3 PVC (PREMATURE VENTRICULAR CONTRACTION): ICD-10-CM

## 2025-06-18 DIAGNOSIS — I34.1 MITRAL VALVE PROLAPSE: ICD-10-CM

## 2025-06-18 LAB
ANION GAP SERPL CALC-SCNC: 13 MMOL/L (ref 7–16)
BUN SERPL-MCNC: 26 MG/DL (ref 8–23)
CALCIUM SERPL-MCNC: 9.7 MG/DL (ref 8.8–10.2)
CHLORIDE SERPL-SCNC: 101 MMOL/L (ref 98–107)
CO2 SERPL-SCNC: 24 MMOL/L (ref 20–29)
CREAT SERPL-MCNC: 1.16 MG/DL (ref 0.6–1.1)
GLUCOSE SERPL-MCNC: 110 MG/DL (ref 70–99)
MAGNESIUM SERPL-MCNC: 2.2 MG/DL (ref 1.8–2.4)
POTASSIUM SERPL-SCNC: 4.1 MMOL/L (ref 3.5–5.1)
SODIUM SERPL-SCNC: 138 MMOL/L (ref 136–145)

## 2025-06-19 ENCOUNTER — RESULTS FOLLOW-UP (OUTPATIENT)
Dept: CARDIOLOGY | Age: 89
End: 2025-06-19

## 2025-06-19 NOTE — TELEPHONE ENCOUNTER
----- Message from Dr. Hieu Smith MD sent at 6/19/2025  7:19 AM EDT -----  No major abnormalities.  Continue current meds without change and keep routine followup.

## 2025-06-19 NOTE — TELEPHONE ENCOUNTER
Called and spoke with patient and informed her that per Dr. Smith \" No major abnormalities. Continue current meds without change and keep routine followup. \"  Patient voiced understanding.

## 2025-07-30 DIAGNOSIS — I25.119 CORONARY ARTERY DISEASE INVOLVING NATIVE CORONARY ARTERY OF NATIVE HEART WITH ANGINA PECTORIS: Primary | ICD-10-CM

## 2025-07-30 RX ORDER — ATORVASTATIN CALCIUM 10 MG/1
10 TABLET, FILM COATED ORAL DAILY
Qty: 90 TABLET | Refills: 0 | Status: SHIPPED | OUTPATIENT
Start: 2025-07-30

## 2025-07-30 NOTE — TELEPHONE ENCOUNTER
Requested Prescriptions     Pending Prescriptions Disp Refills    atorvastatin (LIPITOR) 10 MG tablet [Pharmacy Med Name: ATORVASTATIN CALCIUM 10 MG Oral Tablet] 90 tablet 0     Sig: TAKE 1 TABLET EVERY DAY

## 2025-08-18 ENCOUNTER — APPOINTMENT (OUTPATIENT)
Dept: URBAN - METROPOLITAN AREA CLINIC 23 | Facility: CLINIC | Age: 89
Setting detail: DERMATOLOGY
End: 2025-08-18

## 2025-08-18 DIAGNOSIS — L82.1 OTHER SEBORRHEIC KERATOSIS: ICD-10-CM

## 2025-08-18 DIAGNOSIS — L82.0 INFLAMED SEBORRHEIC KERATOSIS: ICD-10-CM

## 2025-08-18 DIAGNOSIS — R20.2 PARESTHESIA OF SKIN: ICD-10-CM

## 2025-08-18 DIAGNOSIS — D485 NEOPLASM OF UNCERTAIN BEHAVIOR OF SKIN: ICD-10-CM

## 2025-08-18 PROBLEM — D48.5 NEOPLASM OF UNCERTAIN BEHAVIOR OF SKIN: Status: ACTIVE | Noted: 2025-08-18

## 2025-08-18 PROCEDURE — ? LIQUID NITROGEN

## 2025-08-18 PROCEDURE — ? COUNSELING

## 2025-08-18 PROCEDURE — ? SHAVE REMOVAL

## 2025-08-18 RX ORDER — SPIRONOLACTONE 25 MG/1
25 TABLET ORAL DAILY
Qty: 90 TABLET | Refills: 3 | Status: SHIPPED | OUTPATIENT
Start: 2025-08-18

## 2025-08-18 ASSESSMENT — LOCATION SIMPLE DESCRIPTION DERM
LOCATION SIMPLE: UPPER BACK
LOCATION SIMPLE: RIGHT CHEEK
LOCATION SIMPLE: LEFT UPPER BACK
LOCATION SIMPLE: RIGHT HAND

## 2025-08-18 ASSESSMENT — LOCATION DETAILED DESCRIPTION DERM
LOCATION DETAILED: RIGHT RADIAL DORSAL HAND
LOCATION DETAILED: LEFT SUPERIOR UPPER BACK
LOCATION DETAILED: INFERIOR THORACIC SPINE
LOCATION DETAILED: RIGHT SUPERIOR CENTRAL MALAR CHEEK

## 2025-08-18 ASSESSMENT — LOCATION ZONE DERM
LOCATION ZONE: TRUNK
LOCATION ZONE: FACE
LOCATION ZONE: HAND

## 2025-08-20 ENCOUNTER — TELEPHONE (OUTPATIENT)
Age: 89
End: 2025-08-20

## 2025-09-02 ENCOUNTER — OFFICE VISIT (OUTPATIENT)
Age: 89
End: 2025-09-02
Payer: MEDICARE

## 2025-09-02 VITALS
HEIGHT: 61 IN | DIASTOLIC BLOOD PRESSURE: 60 MMHG | BODY MASS INDEX: 21.77 KG/M2 | HEART RATE: 84 BPM | WEIGHT: 115.3 LBS | SYSTOLIC BLOOD PRESSURE: 106 MMHG

## 2025-09-02 DIAGNOSIS — I51.81 TAKOTSUBO CARDIOMYOPATHY: ICD-10-CM

## 2025-09-02 DIAGNOSIS — I49.3 PVC (PREMATURE VENTRICULAR CONTRACTION): ICD-10-CM

## 2025-09-02 DIAGNOSIS — E78.5 DYSLIPIDEMIA: ICD-10-CM

## 2025-09-02 DIAGNOSIS — I25.119 CORONARY ARTERY DISEASE INVOLVING NATIVE CORONARY ARTERY OF NATIVE HEART WITH ANGINA PECTORIS: Primary | ICD-10-CM

## 2025-09-02 DIAGNOSIS — I34.0 NON-RHEUMATIC MITRAL REGURGITATION: ICD-10-CM

## 2025-09-02 DIAGNOSIS — I35.1 NONRHEUMATIC AORTIC VALVE INSUFFICIENCY: ICD-10-CM

## 2025-09-02 DIAGNOSIS — I34.1 MITRAL VALVE PROLAPSE: ICD-10-CM

## 2025-09-02 PROCEDURE — 1126F AMNT PAIN NOTED NONE PRSNT: CPT | Performed by: INTERNAL MEDICINE

## 2025-09-02 PROCEDURE — 99214 OFFICE O/P EST MOD 30 MIN: CPT | Performed by: INTERNAL MEDICINE

## 2025-09-02 PROCEDURE — 1123F ACP DISCUSS/DSCN MKR DOCD: CPT | Performed by: INTERNAL MEDICINE

## 2025-09-02 PROCEDURE — 1159F MED LIST DOCD IN RCRD: CPT | Performed by: INTERNAL MEDICINE

## 2025-09-02 PROCEDURE — 1036F TOBACCO NON-USER: CPT | Performed by: INTERNAL MEDICINE

## 2025-09-02 PROCEDURE — G8427 DOCREV CUR MEDS BY ELIG CLIN: HCPCS | Performed by: INTERNAL MEDICINE

## 2025-09-02 PROCEDURE — G8420 CALC BMI NORM PARAMETERS: HCPCS | Performed by: INTERNAL MEDICINE

## 2025-09-02 PROCEDURE — 1090F PRES/ABSN URINE INCON ASSESS: CPT | Performed by: INTERNAL MEDICINE

## 2025-09-02 PROCEDURE — 1160F RVW MEDS BY RX/DR IN RCRD: CPT | Performed by: INTERNAL MEDICINE

## 2025-09-02 RX ORDER — FUROSEMIDE 20 MG/1
20 TABLET ORAL DAILY
Qty: 90 TABLET | Refills: 3
Start: 2025-09-02 | End: 2025-09-02

## 2025-09-02 RX ORDER — SPIRONOLACTONE 25 MG/1
25 TABLET ORAL DAILY
Qty: 90 TABLET | Refills: 3
Start: 2025-09-02

## 2025-09-02 RX ORDER — FUROSEMIDE 20 MG/1
20 TABLET ORAL DAILY
Qty: 90 TABLET | Refills: 3 | Status: SHIPPED | OUTPATIENT
Start: 2025-09-02

## (undated) DEVICE — GLIDESHEATH SLENDER STAINLESS STEEL KIT: Brand: GLIDESHEATH SLENDER

## (undated) DEVICE — RADIFOCUS OPTITORQUE ANGIOGRAPHIC CATHETER: Brand: OPTITORQUE

## (undated) DEVICE — CATHETER DIAG AD 5FR L110CM 145DEG COR GRY HYDRPHLC NYL

## (undated) DEVICE — PINNACLE INTRODUCER SHEATH: Brand: PINNACLE

## (undated) DEVICE — CATHETER DIAG AD 6FR L100CM 0.038IN STD COR POLYUR JUDKINS

## (undated) DEVICE — CATHETER ANGIO JL4 0.038 IN AD 6 FRX100 CM STD SUPER TORQUE

## (undated) DEVICE — Device

## (undated) DEVICE — GUIDEWIRE 035IN 210CM PTFE COAT FIX COR J TIP 15MM FIRM BODY

## (undated) DEVICE — BAND COMPR L21CM SHT CLR PLAS HEMSTAT EXT HK AND LOOP RETEN